# Patient Record
Sex: FEMALE | Race: BLACK OR AFRICAN AMERICAN | NOT HISPANIC OR LATINO | Employment: OTHER | URBAN - METROPOLITAN AREA
[De-identification: names, ages, dates, MRNs, and addresses within clinical notes are randomized per-mention and may not be internally consistent; named-entity substitution may affect disease eponyms.]

---

## 2020-06-25 ENCOUNTER — HOSPITAL ENCOUNTER (INPATIENT)
Facility: HOSPITAL | Age: 85
LOS: 6 days | Discharge: HOME HEALTH | DRG: 177 | End: 2020-07-01
Attending: EMERGENCY MEDICINE | Admitting: INTERNAL MEDICINE
Payer: MEDICARE

## 2020-06-25 DIAGNOSIS — U07.1 COVID-19: Primary | ICD-10-CM

## 2020-06-25 DIAGNOSIS — J96.01 ACUTE HYPOXEMIC RESPIRATORY FAILURE: ICD-10-CM

## 2020-06-25 DIAGNOSIS — R06.02 SOB (SHORTNESS OF BREATH): ICD-10-CM

## 2020-06-25 PROBLEM — I10 ESSENTIAL HYPERTENSION: Status: ACTIVE | Noted: 2020-06-25

## 2020-06-25 PROBLEM — K21.9 GASTROESOPHAGEAL REFLUX DISEASE WITHOUT ESOPHAGITIS: Status: ACTIVE | Noted: 2020-06-25

## 2020-06-25 PROBLEM — M19.91 PRIMARY OSTEOARTHRITIS: Status: ACTIVE | Noted: 2020-06-25

## 2020-06-25 LAB
ALBUMIN SERPL BCP-MCNC: 2.1 G/DL (ref 3.5–5.2)
ALLENS TEST: ABNORMAL
ALP SERPL-CCNC: 134 U/L (ref 55–135)
ALT SERPL W/O P-5'-P-CCNC: 14 U/L (ref 10–44)
ANION GAP SERPL CALC-SCNC: 8 MMOL/L (ref 8–16)
APTT BLDCRRT: 23.9 SEC (ref 21–32)
AST SERPL-CCNC: 15 U/L (ref 10–40)
BASOPHILS # BLD AUTO: 0.04 K/UL (ref 0–0.2)
BASOPHILS NFR BLD: 0.5 % (ref 0–1.9)
BILIRUB SERPL-MCNC: 0.3 MG/DL (ref 0.1–1)
BNP SERPL-MCNC: 43 PG/ML (ref 0–99)
BUN SERPL-MCNC: 20 MG/DL (ref 8–23)
CALCIUM SERPL-MCNC: 8.6 MG/DL (ref 8.7–10.5)
CHLORIDE SERPL-SCNC: 109 MMOL/L (ref 95–110)
CK SERPL-CCNC: 73 U/L (ref 20–180)
CO2 SERPL-SCNC: 20 MMOL/L (ref 23–29)
CREAT SERPL-MCNC: 1 MG/DL (ref 0.5–1.4)
D DIMER PPP IA.FEU-MCNC: 0.44 MG/L FEU
DELSYS: ABNORMAL
DIFFERENTIAL METHOD: ABNORMAL
EOSINOPHIL # BLD AUTO: 0.1 K/UL (ref 0–0.5)
EOSINOPHIL NFR BLD: 0.6 % (ref 0–8)
ERYTHROCYTE [DISTWIDTH] IN BLOOD BY AUTOMATED COUNT: 19.6 % (ref 11.5–14.5)
ERYTHROCYTE [SEDIMENTATION RATE] IN BLOOD BY WESTERGREN METHOD: 130 MM/HR (ref 0–20)
EST. GFR  (AFRICAN AMERICAN): 59 ML/MIN/1.73 M^2
EST. GFR  (NON AFRICAN AMERICAN): 51 ML/MIN/1.73 M^2
FIO2: 36
FLOW: 4
GLUCOSE SERPL-MCNC: 112 MG/DL (ref 70–110)
HCO3 UR-SCNC: 18.5 MMOL/L (ref 24–28)
HCT VFR BLD AUTO: 28.9 % (ref 37–48.5)
HGB BLD-MCNC: 8.5 G/DL (ref 12–16)
IMM GRANULOCYTES # BLD AUTO: 0.05 K/UL (ref 0–0.04)
IMM GRANULOCYTES NFR BLD AUTO: 0.6 % (ref 0–0.5)
INR PPP: 1.1 (ref 0.8–1.2)
LACTATE SERPL-SCNC: 1.4 MMOL/L (ref 0.5–2.2)
LDH SERPL L TO P-CCNC: 283 U/L (ref 110–260)
LYMPHOCYTES # BLD AUTO: 0.6 K/UL (ref 1–4.8)
LYMPHOCYTES NFR BLD: 6.7 % (ref 18–48)
MAGNESIUM SERPL-MCNC: 1.8 MG/DL (ref 1.6–2.6)
MCH RBC QN AUTO: 25.4 PG (ref 27–31)
MCHC RBC AUTO-ENTMCNC: 29.4 G/DL (ref 32–36)
MCV RBC AUTO: 86 FL (ref 82–98)
MODE: ABNORMAL
MONOCYTES # BLD AUTO: 0.3 K/UL (ref 0.3–1)
MONOCYTES NFR BLD: 3.6 % (ref 4–15)
NEUTROPHILS # BLD AUTO: 7.5 K/UL (ref 1.8–7.7)
NEUTROPHILS NFR BLD: 88 % (ref 38–73)
NRBC BLD-RTO: 0 /100 WBC
PCO2 BLDA: 27.9 MMHG (ref 35–45)
PH SMN: 7.43 [PH] (ref 7.35–7.45)
PLATELET # BLD AUTO: 395 K/UL (ref 150–350)
PMV BLD AUTO: 11.4 FL (ref 9.2–12.9)
PO2 BLDA: 65 MMHG (ref 80–100)
POC BE: -6 MMOL/L
POC SATURATED O2: 93 % (ref 95–100)
POCT GLUCOSE: 155 MG/DL (ref 70–110)
POTASSIUM SERPL-SCNC: 3.9 MMOL/L (ref 3.5–5.1)
PROCALCITONIN SERPL IA-MCNC: 0.38 NG/ML
PROT SERPL-MCNC: 6.7 G/DL (ref 6–8.4)
PROTHROMBIN TIME: 11.9 SEC (ref 9–12.5)
RBC # BLD AUTO: 3.35 M/UL (ref 4–5.4)
SAMPLE: ABNORMAL
SARS-COV-2 RDRP RESP QL NAA+PROBE: POSITIVE
SITE: ABNORMAL
SODIUM SERPL-SCNC: 137 MMOL/L (ref 136–145)
TROPONIN I SERPL DL<=0.01 NG/ML-MCNC: 0.05 NG/ML (ref 0–0.03)
WBC # BLD AUTO: 8.51 K/UL (ref 3.9–12.7)

## 2020-06-25 PROCEDURE — 82803 BLOOD GASES ANY COMBINATION: CPT

## 2020-06-25 PROCEDURE — U0002 COVID-19 LAB TEST NON-CDC: HCPCS

## 2020-06-25 PROCEDURE — 99291 CRITICAL CARE FIRST HOUR: CPT | Mod: 25

## 2020-06-25 PROCEDURE — 21400001 HC TELEMETRY ROOM

## 2020-06-25 PROCEDURE — 85025 COMPLETE CBC W/AUTO DIFF WBC: CPT

## 2020-06-25 PROCEDURE — 82550 ASSAY OF CK (CPK): CPT

## 2020-06-25 PROCEDURE — 83735 ASSAY OF MAGNESIUM: CPT

## 2020-06-25 PROCEDURE — 27000221 HC OXYGEN, UP TO 24 HOURS

## 2020-06-25 PROCEDURE — 84145 PROCALCITONIN (PCT): CPT

## 2020-06-25 PROCEDURE — 36600 WITHDRAWAL OF ARTERIAL BLOOD: CPT

## 2020-06-25 PROCEDURE — 80053 COMPREHEN METABOLIC PANEL: CPT

## 2020-06-25 PROCEDURE — 94760 N-INVAS EAR/PLS OXIMETRY 1: CPT

## 2020-06-25 PROCEDURE — 83880 ASSAY OF NATRIURETIC PEPTIDE: CPT

## 2020-06-25 PROCEDURE — 25000003 PHARM REV CODE 250: Performed by: INTERNAL MEDICINE

## 2020-06-25 PROCEDURE — 85651 RBC SED RATE NONAUTOMATED: CPT

## 2020-06-25 PROCEDURE — 99900035 HC TECH TIME PER 15 MIN (STAT)

## 2020-06-25 PROCEDURE — 93010 EKG 12-LEAD: ICD-10-PCS | Mod: ,,, | Performed by: INTERNAL MEDICINE

## 2020-06-25 PROCEDURE — 83036 HEMOGLOBIN GLYCOSYLATED A1C: CPT

## 2020-06-25 PROCEDURE — 86141 C-REACTIVE PROTEIN HS: CPT

## 2020-06-25 PROCEDURE — 82728 ASSAY OF FERRITIN: CPT

## 2020-06-25 PROCEDURE — 85730 THROMBOPLASTIN TIME PARTIAL: CPT

## 2020-06-25 PROCEDURE — 84484 ASSAY OF TROPONIN QUANT: CPT

## 2020-06-25 PROCEDURE — 87040 BLOOD CULTURE FOR BACTERIA: CPT

## 2020-06-25 PROCEDURE — 93005 ELECTROCARDIOGRAM TRACING: CPT

## 2020-06-25 PROCEDURE — 83605 ASSAY OF LACTIC ACID: CPT

## 2020-06-25 PROCEDURE — 94761 N-INVAS EAR/PLS OXIMETRY MLT: CPT

## 2020-06-25 PROCEDURE — 83615 LACTATE (LD) (LDH) ENZYME: CPT

## 2020-06-25 PROCEDURE — 36415 COLL VENOUS BLD VENIPUNCTURE: CPT

## 2020-06-25 PROCEDURE — 85610 PROTHROMBIN TIME: CPT

## 2020-06-25 PROCEDURE — 85379 FIBRIN DEGRADATION QUANT: CPT

## 2020-06-25 PROCEDURE — 63600175 PHARM REV CODE 636 W HCPCS: Performed by: INTERNAL MEDICINE

## 2020-06-25 PROCEDURE — 93010 ELECTROCARDIOGRAM REPORT: CPT | Mod: ,,, | Performed by: INTERNAL MEDICINE

## 2020-06-25 RX ORDER — GLUCAGON 1 MG
1 KIT INJECTION
Status: DISCONTINUED | OUTPATIENT
Start: 2020-06-25 | End: 2020-07-01 | Stop reason: HOSPADM

## 2020-06-25 RX ORDER — AMLODIPINE BESYLATE 10 MG/1
10 TABLET ORAL DAILY
Status: DISCONTINUED | OUTPATIENT
Start: 2020-06-26 | End: 2020-07-01 | Stop reason: HOSPADM

## 2020-06-25 RX ORDER — ACETAMINOPHEN 325 MG/1
650 TABLET ORAL EVERY 6 HOURS PRN
Status: DISCONTINUED | OUTPATIENT
Start: 2020-06-25 | End: 2020-06-25

## 2020-06-25 RX ORDER — FAMOTIDINE 20 MG/1
20 TABLET, FILM COATED ORAL DAILY
Status: DISCONTINUED | OUTPATIENT
Start: 2020-06-26 | End: 2020-06-27

## 2020-06-25 RX ORDER — ACETAMINOPHEN 325 MG/1
650 TABLET ORAL EVERY 6 HOURS PRN
Status: DISCONTINUED | OUTPATIENT
Start: 2020-06-25 | End: 2020-07-01 | Stop reason: HOSPADM

## 2020-06-25 RX ORDER — OXYBUTYNIN CHLORIDE 5 MG/1
5 TABLET, EXTENDED RELEASE ORAL DAILY
Status: DISCONTINUED | OUTPATIENT
Start: 2020-06-26 | End: 2020-07-01 | Stop reason: HOSPADM

## 2020-06-25 RX ORDER — IBUPROFEN 200 MG
24 TABLET ORAL
Status: DISCONTINUED | OUTPATIENT
Start: 2020-06-25 | End: 2020-07-01 | Stop reason: HOSPADM

## 2020-06-25 RX ORDER — IBUPROFEN 200 MG
16 TABLET ORAL
Status: DISCONTINUED | OUTPATIENT
Start: 2020-06-25 | End: 2020-07-01 | Stop reason: HOSPADM

## 2020-06-25 RX ORDER — INSULIN ASPART 100 [IU]/ML
1-10 INJECTION, SOLUTION INTRAVENOUS; SUBCUTANEOUS
Status: DISCONTINUED | OUTPATIENT
Start: 2020-06-25 | End: 2020-07-01 | Stop reason: HOSPADM

## 2020-06-25 RX ORDER — GABAPENTIN 100 MG/1
100 CAPSULE ORAL NIGHTLY
Status: DISCONTINUED | OUTPATIENT
Start: 2020-06-25 | End: 2020-07-01 | Stop reason: HOSPADM

## 2020-06-25 RX ADMIN — INSULIN ASPART 1 UNITS: 100 INJECTION, SOLUTION INTRAVENOUS; SUBCUTANEOUS at 09:06

## 2020-06-25 RX ADMIN — GABAPENTIN 100 MG: 100 CAPSULE ORAL at 09:06

## 2020-06-25 NOTE — ED NOTES
Call Lab at this time to come and stick patient for second lab cultures- patient has been stuck many time unsuccessfully

## 2020-06-25 NOTE — SUBJECTIVE & OBJECTIVE
No past medical history on file.    No past surgical history on file.    Review of patient's allergies indicates:   Allergen Reactions    Ace inhibitors Anaphylaxis    Amoxicillin Itching    Codeine Other (See Comments)     confusion    Penicillins Itching    Sulfa (sulfonamide antibiotics) Rash       No current facility-administered medications on file prior to encounter.      Current Outpatient Medications on File Prior to Encounter   Medication Sig    amlodipine (NORVASC) 10 MG tablet     diclofenac sodium (VOLTAREN) 1 % Gel Apply 4 g topically 3 (three) times daily.    gabapentin (NEURONTIN) 100 MG capsule     glipiZIDE (GLUCOTROL) 10 MG tablet     hydrochlorothiazide (HYDRODIURIL) 25 MG tablet     metformin (GLUCOPHAGE) 500 MG tablet     naproxen (NAPROSYN) 375 MG tablet     oxybutynin (DITROPAN-XL) 5 MG TR24     ranitidine (ZANTAC) 150 MG tablet      Family History     None        Tobacco Use    Smoking status: Former Smoker     Types: Cigarettes     Quit date: 1993     Years since quittin.2    Smokeless tobacco: Never Used   Substance and Sexual Activity    Alcohol use: No    Drug use: No    Sexual activity: Never     Partners: Male     Review of Systems   Constitutional: Positive for activity change, chills and fatigue.   HENT: Negative for congestion, ear pain, rhinorrhea and sore throat.    Respiratory: Positive for cough, chest tightness and shortness of breath.    Cardiovascular: Negative for chest pain.   Gastrointestinal: Positive for nausea. Negative for abdominal pain, diarrhea and vomiting.   Musculoskeletal: Positive for arthralgias.   Neurological: Negative for headaches.     Objective:     Vital Signs (Most Recent):  Temp: 98.9 °F (37.2 °C) (20 1703)  Pulse: 78 (20 1703)  Resp: 18 (20 1703)  BP: (!) 153/69 (20 1703)  SpO2: 100 % (20 1407) Vital Signs (24h Range):  Temp:  [98.6 °F (37 °C)-98.9 °F (37.2 °C)] 98.9 °F (37.2 °C)  Pulse:   [78-91] 78  Resp:  [16-34] 18  SpO2:  [89 %-100 %] 100 %  BP: (120-153)/(56-69) 153/69     Weight: 71.2 kg (157 lb)  Body mass index is 26.95 kg/m².    Physical Exam  Vitals signs and nursing note reviewed.   Constitutional:       Appearance: She is well-developed. She is ill-appearing.   HENT:      Head: Normocephalic and atraumatic.      Right Ear: External ear normal.      Left Ear: External ear normal.      Nose: Nose normal.   Eyes:      Conjunctiva/sclera: Conjunctivae normal.      Pupils: Pupils are equal, round, and reactive to light.   Neck:      Musculoskeletal: Normal range of motion and neck supple.      Thyroid: No thyromegaly.      Vascular: No JVD.   Cardiovascular:      Rate and Rhythm: Normal rate and regular rhythm.      Heart sounds: Normal heart sounds. No murmur. No friction rub. No gallop.    Pulmonary:      Effort: Pulmonary effort is normal. No respiratory distress.      Breath sounds: Normal breath sounds. No stridor. No wheezing or rales.   Chest:      Chest wall: No tenderness.   Abdominal:      General: Bowel sounds are normal. There is no distension.      Palpations: Abdomen is soft. There is no mass.      Tenderness: There is no abdominal tenderness. There is no guarding or rebound.   Genitourinary:     Vagina: Normal. No vaginal discharge.   Musculoskeletal: Normal range of motion.         General: No deformity.   Lymphadenopathy:      Cervical: No cervical adenopathy.   Skin:     General: Skin is warm.      Capillary Refill: Capillary refill takes less than 2 seconds.      Findings: No erythema or rash.   Neurological:      Mental Status: She is alert and oriented to person, place, and time.      Cranial Nerves: No cranial nerve deficit.      Sensory: No sensory deficit.      Deep Tendon Reflexes: Reflexes normal.   Psychiatric:         Behavior: Behavior normal.           CRANIAL NERVES     CN III, IV, VI   Pupils are equal, round, and reactive to light.       Significant Labs: All  pertinent labs within the past 24 hours have been reviewed.    Significant Imaging: I have reviewed and interpreted all pertinent imaging results/findings within the past 24 hours.

## 2020-06-25 NOTE — ED PROVIDER NOTES
Encounter Date: 2020    SCRIBE #1 NOTE: I, Linus Haro, am scribing for, and in the presence of,  Nemesio Queen MD. I have scribed the following portions of the note - Other sections scribed: ED Course.       History     Chief Complaint   Patient presents with    Shortness of Breath     pt sent from nursing home with shortness of breath.       86 y/o F with PMH of COVID here with worsening SOB. Patient says that today, she was unable to catch her breath. This is worsening over the past week, and patient has had 2 negative COVID tests during this time. Patient on 5L [place] oxygen, prior to COVID, she was not needing any oxygen. Patient otherwise has no complaints. Coming from nursing home. History of dementia, history limited.         Review of patient's allergies indicates:   Allergen Reactions    Ace inhibitors Anaphylaxis    Amoxicillin Itching    Codeine Other (See Comments)     confusion    Penicillins Itching    Sulfa (sulfonamide antibiotics) Rash     Past Medical History:  History reviewed. No pertinent medical history.    Past Surgical History:  History reviewed. No pertinent surgical history.    Family History:  History reviewed. No pertinent family history.      Social History     Tobacco Use    Smoking status: Former Smoker     Types: Cigarettes     Quit date: 1993     Years since quittin.2    Smokeless tobacco: Never Used   Substance Use Topics    Alcohol use: No    Drug use: No     Review of Systems   Unable to perform ROS: Dementia   Respiratory: Positive for shortness of breath.        Physical Exam     Initial Vitals   BP Pulse Resp Temp SpO2   20 1218 20 1218 20 1218 20 1100 20 1218   123/60 88 (!) 25 98.6 °F (37 °C) 100 %      MAP       --                Physical Exam    Constitutional:   Elderly. No acute distress.    HENT:   Head: Normocephalic and atraumatic.   Eyes: EOM are normal. Pupils are equal, round, and reactive to light.   Neck:  Normal range of motion. Neck supple.   Cardiovascular: Regular rhythm.   Tachycardia   Pulmonary/Chest: She is in respiratory distress. She has no wheezes.   Mild respiratory distress. Needing NC oxygen.    Abdominal: She exhibits no distension. There is no abdominal tenderness.   Neurological: She is alert.   Awake. Oriented to person, situation, not place or time.    Skin: Skin is warm and dry.   Psychiatric: She has a normal mood and affect.         ED Course   Critical Care    Date/Time: 6/25/2020 12:59 PM  Performed by: Nemesio Queen MD  Authorized by: Nemesio Queen MD   Direct patient critical care time: 20 minutes  Additional history critical care time: 10 minutes  Ordering / reviewing critical care time: 5 minutes  Documentation critical care time: 4 minutes  Total critical care time (exclusive of procedural time) : 39 minutes  Critical care time was exclusive of separately billable procedures and treating other patients and teaching time.  Critical care was necessary to treat or prevent imminent or life-threatening deterioration of the following conditions: acute respiratory failure.  Critical care was time spent personally by me on the following activities: blood draw for specimens, development of treatment plan with patient or surrogate, interpretation of cardiac output measurements, evaluation of patient's response to treatment, examination of patient, obtaining history from patient or surrogate, ordering and performing treatments and interventions, ordering and review of laboratory studies, ordering and review of radiographic studies, pulse oximetry, re-evaluation of patient's condition and review of old charts.        Labs Reviewed   SARS-COV-2 RNA AMPLIFICATION, QUAL - Abnormal; Notable for the following components:       Result Value    SARS-CoV-2 RNA, Amplification, Qual Positive (*)     All other components within normal limits   CBC W/ AUTO DIFFERENTIAL - Abnormal; Notable for the following  components:    RBC 3.35 (*)     Hemoglobin 8.5 (*)     Hematocrit 28.9 (*)     Mean Corpuscular Hemoglobin 25.4 (*)     Mean Corpuscular Hemoglobin Conc 29.4 (*)     RDW 19.6 (*)     Platelets 395 (*)     Immature Granulocytes 0.6 (*)     Immature Grans (Abs) 0.05 (*)     Lymph # 0.6 (*)     Gran% 88.0 (*)     Lymph% 6.7 (*)     Mono% 3.6 (*)     All other components within normal limits   COMPREHENSIVE METABOLIC PANEL - Abnormal; Notable for the following components:    CO2 20 (*)     Glucose 112 (*)     Calcium 8.6 (*)     Albumin 2.1 (*)     eGFR if  59 (*)     eGFR if non  51 (*)     All other components within normal limits   TROPONIN I - Abnormal; Notable for the following components:    Troponin I 0.046 (*)     All other components within normal limits   ISTAT PROCEDURE - Abnormal; Notable for the following components:    POC PCO2 27.9 (*)     POC PO2 65 (*)     POC HCO3 18.5 (*)     POC SATURATED O2 93 (*)     All other components within normal limits   CULTURE, BLOOD   B-TYPE NATRIURETIC PEPTIDE   MAGNESIUM   LACTIC ACID, PLASMA   URINALYSIS, REFLEX TO URINE CULTURE     Results for orders placed or performed during the hospital encounter of 06/25/20   COVID-19 Rapid Screening   Result Value Ref Range    SARS-CoV-2 RNA, Amplification, Qual Positive (A) Negative   CBC auto differential   Result Value Ref Range    WBC 8.51 3.90 - 12.70 K/uL    RBC 3.35 (L) 4.00 - 5.40 M/uL    Hemoglobin 8.5 (L) 12.0 - 16.0 g/dL    Hematocrit 28.9 (L) 37.0 - 48.5 %    Mean Corpuscular Volume 86 82 - 98 fL    Mean Corpuscular Hemoglobin 25.4 (L) 27.0 - 31.0 pg    Mean Corpuscular Hemoglobin Conc 29.4 (L) 32.0 - 36.0 g/dL    RDW 19.6 (H) 11.5 - 14.5 %    Platelets 395 (H) 150 - 350 K/uL    MPV 11.4 9.2 - 12.9 fL    Immature Granulocytes 0.6 (H) 0.0 - 0.5 %    Gran # (ANC) 7.5 1.8 - 7.7 K/uL    Immature Grans (Abs) 0.05 (H) 0.00 - 0.04 K/uL    Lymph # 0.6 (L) 1.0 - 4.8 K/uL    Mono # 0.3 0.3 - 1.0  K/uL    Eos # 0.1 0.0 - 0.5 K/uL    Baso # 0.04 0.00 - 0.20 K/uL    nRBC 0 0 /100 WBC    Gran% 88.0 (H) 38.0 - 73.0 %    Lymph% 6.7 (L) 18.0 - 48.0 %    Mono% 3.6 (L) 4.0 - 15.0 %    Eosinophil% 0.6 0.0 - 8.0 %    Basophil% 0.5 0.0 - 1.9 %    Differential Method Automated    Comprehensive metabolic panel   Result Value Ref Range    Sodium 137 136 - 145 mmol/L    Potassium 3.9 3.5 - 5.1 mmol/L    Chloride 109 95 - 110 mmol/L    CO2 20 (L) 23 - 29 mmol/L    Glucose 112 (H) 70 - 110 mg/dL    BUN, Bld 20 8 - 23 mg/dL    Creatinine 1.0 0.5 - 1.4 mg/dL    Calcium 8.6 (L) 8.7 - 10.5 mg/dL    Total Protein 6.7 6.0 - 8.4 g/dL    Albumin 2.1 (L) 3.5 - 5.2 g/dL    Total Bilirubin 0.3 0.1 - 1.0 mg/dL    Alkaline Phosphatase 134 55 - 135 U/L    AST 15 10 - 40 U/L    ALT 14 10 - 44 U/L    Anion Gap 8 8 - 16 mmol/L    eGFR if African American 59 (A) >60 mL/min/1.73 m^2    eGFR if non African American 51 (A) >60 mL/min/1.73 m^2   Troponin I   Result Value Ref Range    Troponin I 0.046 (H) 0.000 - 0.026 ng/mL   Brain natriuretic peptide   Result Value Ref Range    BNP 43 0 - 99 pg/mL   Magnesium   Result Value Ref Range    Magnesium 1.8 1.6 - 2.6 mg/dL   Lactic acid, plasma   Result Value Ref Range    Lactate (Lactic Acid) 1.4 0.5 - 2.2 mmol/L   CK   Result Value Ref Range    CPK 73 20 - 180 U/L   Lactate dehydrogenase   Result Value Ref Range     (H) 110 - 260 U/L   Sedimentation rate   Result Value Ref Range    Sed Rate 130 (H) 0 - 20 mm/Hr   Procalcitonin   Result Value Ref Range    Procalcitonin 0.38 (H) <0.25 ng/mL   Protime-INR   Result Value Ref Range    Prothrombin Time 11.9 9.0 - 12.5 sec    INR 1.1 0.8 - 1.2   APTT   Result Value Ref Range    aPTT 23.9 21.0 - 32.0 sec   D dimer, quantitative   Result Value Ref Range    D-Dimer 0.44 <0.50 mg/L FEU   ISTAT PROCEDURE   Result Value Ref Range    POC PH 7.428 7.35 - 7.45    POC PCO2 27.9 (LL) 35 - 45 mmHg    POC PO2 65 (L) 80 - 100 mmHg    POC HCO3 18.5 (L) 24 - 28 mmol/L     POC BE -6 -2 to 2 mmol/L    POC SATURATED O2 93 (L) 95 - 100 %    Sample ARTERIAL     Site RR     Allens Test Pass     DelSys Nasal Can     Mode SPONT     Flow 4     FiO2 36    POCT glucose   Result Value Ref Range    POCT Glucose 155 (H) 70 - 110 mg/dL       EKG Readings: (Independently Interpreted)     Rate of 116 beats per minute.  Sinus tachycardia with occasional PVC.  P.r., QRS and QTC within normal limits.  Right axis deviation.  No STEMI.     ECG Results          EKG 12-lead (Final result)  Result time 06/25/20 12:21:30    Final result by Interface, Lab [facility] (06/25/20 12:21:30)                 Narrative:    Test Reason : R06.02,    Vent. Rate : 116 BPM     Atrial Rate : 116 BPM     P-R Int : 152 ms          QRS Dur : 086 ms      QT Int : 350 ms       P-R-T Axes : 024 105 023 degrees     QTc Int : 486 ms    Sinus tachycardia with occasional Premature ventricular complexes  Rightward axis  Possible Anterior infarct ,age undetermined  Abnormal ECG  No previous ECGs available  Confirmed by RITA HOUSTON MD ([id number]) on 6/25/2020 12:21:22 PM    Referred By:             Confirmed By:RITA HOUSTON MD                            Imaging Results          X-Ray Chest AP Portable (Final result)  Result time 06/25/20 12:15:19    Final result by Tai Melendez MD (06/25/20 12:15:19)                 Impression:      Scattered ground-glass pulmonary infiltrates greatest within the lower lobe on the left.Findings are not entirely specific but can be seen with viral pneumonitis/COVID-19.      Electronically signed by: Tai Melendez MD  Date:    06/25/2020  Time:    12:15             Narrative:    EXAMINATION:  XR CHEST AP PORTABLE    CLINICAL HISTORY:  SOB;    FINDINGS:  Single view of the chest.    No comparison    Cardiac silhouette is normal.  Scattered ground-glass pulmonary infiltrates greatest within the lower lobe on the left.Findings are not entirely specific but can be seen with viral pneumonitis/COVID-19..   Small left pleural effusion.  No pneumothorax.  Bones demonstrate moderate degenerative change.                                1:15 PM: Discussed case with Asaf Mccann, Nurse Practitioner ([facility]). Dr. Contreras agrees with current care and management of pt and accepts admission.   Admitting Service: Hospital Medicine  Admit to: obs / tele    Re-evaluated pt. I have discussed test results, shared treatment plan, and the need for admission with patient and family at bedside. Pt and family express understanding at this time and agree with all information. All questions answered. Pt and family have no further questions or concerns at this time. Pt is ready for admit.       Medical Decision Making:   Clinical Tests:   Lab Tests: Ordered and Reviewed  Radiological Study: Ordered and Reviewed  Medical Tests: Ordered and Reviewed            Scribe Attestation:   Scribe #1: I performed the above scribed service and the documentation accurately describes the services I performed. I attest to the accuracy of the note.    Attending Attestation:           Physician Attestation for Scribe:  Physician Attestation Statement for Scribe #1: I, Nemesio Queen MD, reviewed documentation, as scribed by Linus Haro in my presence, and it is both accurate and complete.                 ED Course as of Jun 25 2319   Thu Jun 25, 2020   1256 SARS-CoV-2 RNA, Amplification, Qual(!): Positive [BA]      ED Course User Index  [BA] Nemesio Queen MD                Clinical Impression:       ICD-10-CM ICD-9-CM   1. COVID-19  U07.1    2. SOB (shortness of breath)  R06.02 786.05   3. Acute hypoxemic respiratory failure  J96.01 518.81         Disposition:   Disposition: Placed in Observation  Condition: Fair     ED Disposition Condition    Observation                           Nemesio Queen MD  06/25/20 [id number]

## 2020-06-25 NOTE — H&P
[facility] Hospital Medicine  History & Physical    Patient Name: Hilary Esquead  MRN: [id number]  Admission Date: 6/25/2020  Attending Physician: Mauro Contreras MD   Primary Care Provider: Afua Hector MD         Patient information was obtained from patient and ER records.     Subjective:     Principal Problem:COVID-19    Chief Complaint:   Chief Complaint   Patient presents with    Shortness of Breath     pt sent from nursing home with shortness of breath.          HPI: Mrs. Esqueda is an 86 yo AAF with Hx of HTN, DM, GERD, OA, Mild Dementia who comes from NH for evaluation of SOB. Per patient she reports that for the past week she has not been feeling well. She endorses chills, cough, SOB, nausea. Notes that she had two negative COVID test during this time. Today she was unable to catch her breath and required oxygen. In ED patient noted to be hypoxic, with COVID+ and CXR concerning for PNA and admitted for further management.       No past medical history on file.    No past surgical history on file.    Review of patient's allergies indicates:   Allergen Reactions    Ace inhibitors Anaphylaxis    Amoxicillin Itching    Codeine Other (See Comments)     confusion    Penicillins Itching    Sulfa (sulfonamide antibiotics) Rash       No current facility-administered medications on file prior to encounter.      Current Outpatient Medications on File Prior to Encounter   Medication Sig    amlodipine (NORVASC) 10 MG tablet     diclofenac sodium (VOLTAREN) 1 % Gel Apply 4 g topically 3 (three) times daily.    gabapentin (NEURONTIN) 100 MG capsule     glipiZIDE (GLUCOTROL) 10 MG tablet     hydrochlorothiazide (HYDRODIURIL) 25 MG tablet     metformin (GLUCOPHAGE) 500 MG tablet     naproxen (NAPROSYN) 375 MG tablet     oxybutynin (DITROPAN-XL) 5 MG TR24     ranitidine (ZANTAC) 150 MG tablet      Family History     None        Tobacco Use    Smoking status: Former Smoker     Types:  Cigarettes     Quit date: 1993     Years since quittin.2    Smokeless tobacco: Never Used   Substance and Sexual Activity    Alcohol use: No    Drug use: No    Sexual activity: Never     Partners: Male     Review of Systems   Constitutional: Positive for activity change, chills and fatigue.   HENT: Negative for congestion, ear pain, rhinorrhea and sore throat.    Respiratory: Positive for cough, chest tightness and shortness of breath.    Cardiovascular: Negative for chest pain.   Gastrointestinal: Positive for nausea. Negative for abdominal pain, diarrhea and vomiting.   Musculoskeletal: Positive for arthralgias.   Neurological: Negative for headaches.     Objective:     Vital Signs (Most Recent):  Temp: 98.9 °F (37.2 °C) (20 1703)  Pulse: 78 (20 1703)  Resp: 18 (20 1703)  BP: (!) 153/69 (20 1703)  SpO2: 100 % (20 1407) Vital Signs (24h Range):  Temp:  [98.6 °F (37 °C)-98.9 °F (37.2 °C)] 98.9 °F (37.2 °C)  Pulse:  [78-91] 78  Resp:  [16-34] 18  SpO2:  [89 %-100 %] 100 %  BP: (120-153)/(56-69) 153/69     Weight: 71.2 kg (157 lb)  Body mass index is 26.95 kg/m².    Physical Exam  Vitals signs and nursing note reviewed.   Constitutional:       Appearance: She is well-developed. She is ill-appearing.   HENT:      Head: Normocephalic and atraumatic.      Right Ear: External ear normal.      Left Ear: External ear normal.      Nose: Nose normal.   Eyes:      Conjunctiva/sclera: Conjunctivae normal.      Pupils: Pupils are equal, round, and reactive to light.   Neck:      Musculoskeletal: Normal range of motion and neck supple.      Thyroid: No thyromegaly.      Vascular: No JVD.   Cardiovascular:      Rate and Rhythm: Normal rate and regular rhythm.      Heart sounds: Normal heart sounds. No murmur. No friction rub. No gallop.    Pulmonary:      Effort: Pulmonary effort is normal. No respiratory distress.      Breath sounds: Normal breath sounds. No stridor. No wheezing or  rales.   Chest:      Chest wall: No tenderness.   Abdominal:      General: Bowel sounds are normal. There is no distension.      Palpations: Abdomen is soft. There is no mass.      Tenderness: There is no abdominal tenderness. There is no guarding or rebound.   Genitourinary:     Vagina: Normal. No vaginal discharge.   Musculoskeletal: Normal range of motion.         General: No deformity.   Lymphadenopathy:      Cervical: No cervical adenopathy.   Skin:     General: Skin is warm.      Capillary Refill: Capillary refill takes less than 2 seconds.      Findings: No erythema or rash.   Neurological:      Mental Status: She is alert and oriented to person, place, and time.      Cranial Nerves: No cranial nerve deficit.      Sensory: No sensory deficit.      Deep Tendon Reflexes: Reflexes normal.   Psychiatric:         Behavior: Behavior normal.           CRANIAL NERVES     CN III, IV, VI   Pupils are equal, round, and reactive to light.       Significant Labs: All pertinent labs within the past 24 hours have been reviewed.    Significant Imaging: I have reviewed and interpreted all pertinent imaging results/findings within the past 24 hours.    Assessment/Plan:     * COVID-19  - COVID-19 testing: positive   - Infection Control notified     - Isolation:   - Airborne, Contact and Droplet Precautions  - Cohort patients into COVID units  - N95 mask, wear eye protection  - 20 second hand hygiene              - Limit visitors per hospital policy              - Consolidating lab draws, nursing care, provider visits, and interventions    - Diagnostics: (leukopenia, hyponatremia, hyperferritinemia, elevated troponin, elevated d-dimer, age, and comorbidities are significant predictors of poor clinical outcome)  CBC, CMP, Ferritin, CRP and Portable CXR    - Management:  Supplemental O2 to maintain SpO2 >92%  Telemetry  Continuous/intermittent Pulse Ox  Albuterol treatment PRN  Labs still pending  Pharmacy Consult for Remdesivir                 SOB (shortness of breath)  2/2 COVID   Treatment as above       Acute hypoxemic respiratory failure  Monitor Respiratory Status  Supplemental Oxygen keep O2 Sat >92%        Gastroesophageal reflux disease without esophagitis  PPI     Essential hypertension  Monitor BP  Norvasc started       Diabetes mellitus without complication  Monitor F/S  Diabetic Diet  ISS        VTE Risk Mitigation (From admission, onward)    None             Mauro Contreras MD  Department of Hospital Medicine   [facility]

## 2020-06-25 NOTE — HPI
Mrs. Esqueda is an 88 yo AAF with Hx of HTN, DM, GERD, OA, Mild Dementia who comes from NH for evaluation of SOB. Per patient she reports that for the past week she has not been feeling well. She endorses chills, cough, SOB, nausea. Notes that she had two negative COVID test during this time. Today she was unable to catch her breath and required oxygen. In ED patient noted to be hypoxic, with COVID+ and CXR concerning for PNA and admitted for further management.

## 2020-06-25 NOTE — ED NOTES
Patient was dx with Covid in February and has had two negative test since them. Patient started with SOB and cough x2days ago and was sent over by nursing home to be retested.

## 2020-06-25 NOTE — PLAN OF CARE
Pt AAO x4.  VSS.  Pt remained afebrile throughout this shift.   Pt currently has no IV site.    Pt remained free of falls this shift.   Pt c/o no pain this shift  Plan of care reviewed. Patient verbalizes understanding.   Pt moving/turing independently. Frequent weight shifting encouraged.  Patient afib on monitor.   No SOB reported   Bed low, side rails up x 2, wheels locked, call light in reach.   Bed alarm maintained for safety.   Patient instructed to call for assistance.   Hourly rounding completed.   Will continue to monitor.

## 2020-06-25 NOTE — ASSESSMENT & PLAN NOTE
- COVID-19 testing: positive   - Infection Control notified     - Isolation:   - Airborne, Contact and Droplet Precautions  - Cohort patients into COVID units  - N95 mask, wear eye protection  - 20 second hand hygiene              - Limit visitors per hospital policy              - Consolidating lab draws, nursing care, provider visits, and interventions    - Diagnostics: (leukopenia, hyponatremia, hyperferritinemia, elevated troponin, elevated d-dimer, age, and comorbidities are significant predictors of poor clinical outcome)  CBC, CMP, Ferritin, CRP and Portable CXR    - Management:  Supplemental O2 to maintain SpO2 >92%  Telemetry  Continuous/intermittent Pulse Ox  Albuterol treatment PRN  Labs still pending  Pharmacy Consult for Remdesivir

## 2020-06-26 LAB
ALBUMIN SERPL BCP-MCNC: 1.8 G/DL (ref 3.5–5.2)
ALP SERPL-CCNC: 113 U/L (ref 55–135)
ALT SERPL W/O P-5'-P-CCNC: 12 U/L (ref 10–44)
ANION GAP SERPL CALC-SCNC: 11 MMOL/L (ref 8–16)
AST SERPL-CCNC: 13 U/L (ref 10–40)
BACTERIA #/AREA URNS HPF: ABNORMAL /HPF
BASOPHILS # BLD AUTO: 0.03 K/UL (ref 0–0.2)
BASOPHILS NFR BLD: 0.6 % (ref 0–1.9)
BILIRUB SERPL-MCNC: 0.3 MG/DL (ref 0.1–1)
BILIRUB UR QL STRIP: NEGATIVE
BUN SERPL-MCNC: 18 MG/DL (ref 8–23)
CALCIUM SERPL-MCNC: 8 MG/DL (ref 8.7–10.5)
CHLORIDE SERPL-SCNC: 110 MMOL/L (ref 95–110)
CLARITY UR: ABNORMAL
CO2 SERPL-SCNC: 20 MMOL/L (ref 23–29)
COLOR UR: YELLOW
CREAT SERPL-MCNC: 0.9 MG/DL (ref 0.5–1.4)
CRP SERPL-MCNC: 85.01 MG/L (ref 0–3.19)
DIFFERENTIAL METHOD: ABNORMAL
EOSINOPHIL # BLD AUTO: 0.3 K/UL (ref 0–0.5)
EOSINOPHIL NFR BLD: 6.9 % (ref 0–8)
ERYTHROCYTE [DISTWIDTH] IN BLOOD BY AUTOMATED COUNT: 19.8 % (ref 11.5–14.5)
EST. GFR  (AFRICAN AMERICAN): >60 ML/MIN/1.73 M^2
EST. GFR  (NON AFRICAN AMERICAN): 58 ML/MIN/1.73 M^2
ESTIMATED AVG GLUCOSE: 146 MG/DL (ref 68–131)
FERRITIN SERPL-MCNC: 409 NG/ML (ref 20–300)
GLUCOSE SERPL-MCNC: 90 MG/DL (ref 70–110)
GLUCOSE UR QL STRIP: NEGATIVE
HBA1C MFR BLD HPLC: 6.7 % (ref 4–5.6)
HCT VFR BLD AUTO: 25.3 % (ref 37–48.5)
HGB BLD-MCNC: 7.6 G/DL (ref 12–16)
HGB UR QL STRIP: ABNORMAL
HYALINE CASTS #/AREA URNS LPF: 0 /LPF
IMM GRANULOCYTES # BLD AUTO: 0.04 K/UL (ref 0–0.04)
IMM GRANULOCYTES NFR BLD AUTO: 0.8 % (ref 0–0.5)
KETONES UR QL STRIP: NEGATIVE
LEUKOCYTE ESTERASE UR QL STRIP: ABNORMAL
LYMPHOCYTES # BLD AUTO: 1.3 K/UL (ref 1–4.8)
LYMPHOCYTES NFR BLD: 26.2 % (ref 18–48)
MCH RBC QN AUTO: 26.3 PG (ref 27–31)
MCHC RBC AUTO-ENTMCNC: 30 G/DL (ref 32–36)
MCV RBC AUTO: 88 FL (ref 82–98)
MICROSCOPIC COMMENT: ABNORMAL
MONOCYTES # BLD AUTO: 0.4 K/UL (ref 0.3–1)
MONOCYTES NFR BLD: 7.9 % (ref 4–15)
NEUTROPHILS # BLD AUTO: 2.9 K/UL (ref 1.8–7.7)
NEUTROPHILS NFR BLD: 57.6 % (ref 38–73)
NITRITE UR QL STRIP: NEGATIVE
NRBC BLD-RTO: 0 /100 WBC
PH UR STRIP: 7 [PH] (ref 5–8)
PLATELET # BLD AUTO: 322 K/UL (ref 150–350)
PMV BLD AUTO: 12 FL (ref 9.2–12.9)
POCT GLUCOSE: 100 MG/DL (ref 70–110)
POCT GLUCOSE: 182 MG/DL (ref 70–110)
POCT GLUCOSE: 185 MG/DL (ref 70–110)
POCT GLUCOSE: 214 MG/DL (ref 70–110)
POTASSIUM SERPL-SCNC: 4 MMOL/L (ref 3.5–5.1)
PROT SERPL-MCNC: 5.8 G/DL (ref 6–8.4)
PROT UR QL STRIP: ABNORMAL
RBC # BLD AUTO: 2.89 M/UL (ref 4–5.4)
RBC #/AREA URNS HPF: 5 /HPF (ref 0–4)
SODIUM SERPL-SCNC: 141 MMOL/L (ref 136–145)
SP GR UR STRIP: 1.02 (ref 1–1.03)
URN SPEC COLLECT METH UR: ABNORMAL
UROBILINOGEN UR STRIP-ACNC: NEGATIVE EU/DL
WBC # BLD AUTO: 4.96 K/UL (ref 3.9–12.7)
WBC #/AREA URNS HPF: >100 /HPF (ref 0–5)

## 2020-06-26 PROCEDURE — C1751 CATH, INF, PER/CENT/MIDLINE: HCPCS

## 2020-06-26 PROCEDURE — 87077 CULTURE AEROBIC IDENTIFY: CPT

## 2020-06-26 PROCEDURE — 80053 COMPREHEN METABOLIC PANEL: CPT

## 2020-06-26 PROCEDURE — 36569 INSJ PICC 5 YR+ W/O IMAGING: CPT

## 2020-06-26 PROCEDURE — 87186 SC STD MICRODIL/AGAR DIL: CPT

## 2020-06-26 PROCEDURE — 87088 URINE BACTERIA CULTURE: CPT

## 2020-06-26 PROCEDURE — 25000003 PHARM REV CODE 250: Performed by: INTERNAL MEDICINE

## 2020-06-26 PROCEDURE — 87086 URINE CULTURE/COLONY COUNT: CPT

## 2020-06-26 PROCEDURE — 36415 COLL VENOUS BLD VENIPUNCTURE: CPT

## 2020-06-26 PROCEDURE — 81000 URINALYSIS NONAUTO W/SCOPE: CPT

## 2020-06-26 PROCEDURE — 85025 COMPLETE CBC W/AUTO DIFF WBC: CPT

## 2020-06-26 PROCEDURE — 21400001 HC TELEMETRY ROOM

## 2020-06-26 PROCEDURE — 27000221 HC OXYGEN, UP TO 24 HOURS

## 2020-06-26 RX ORDER — DOXYCYCLINE HYCLATE 100 MG
100 TABLET ORAL EVERY 12 HOURS
Status: COMPLETED | OUTPATIENT
Start: 2020-06-26 | End: 2020-06-30

## 2020-06-26 RX ORDER — MAG HYDROX/ALUMINUM HYD/SIMETH 200-200-20
30 SUSPENSION, ORAL (FINAL DOSE FORM) ORAL EVERY 6 HOURS PRN
Status: DISCONTINUED | OUTPATIENT
Start: 2020-06-26 | End: 2020-07-01 | Stop reason: HOSPADM

## 2020-06-26 RX ORDER — LIDOCAINE HYDROCHLORIDE 10 MG/ML
1 INJECTION INFILTRATION; PERINEURAL ONCE AS NEEDED
Status: DISCONTINUED | OUTPATIENT
Start: 2020-06-26 | End: 2020-07-01 | Stop reason: HOSPADM

## 2020-06-26 RX ORDER — TRAMADOL HYDROCHLORIDE 50 MG/1
50 TABLET ORAL EVERY 6 HOURS PRN
Status: DISCONTINUED | OUTPATIENT
Start: 2020-06-26 | End: 2020-07-01 | Stop reason: HOSPADM

## 2020-06-26 RX ADMIN — INSULIN ASPART 4 UNITS: 100 INJECTION, SOLUTION INTRAVENOUS; SUBCUTANEOUS at 05:06

## 2020-06-26 RX ADMIN — GABAPENTIN 100 MG: 100 CAPSULE ORAL at 10:06

## 2020-06-26 RX ADMIN — AMLODIPINE BESYLATE 10 MG: 10 TABLET ORAL at 09:06

## 2020-06-26 RX ADMIN — INSULIN ASPART 2 UNITS: 100 INJECTION, SOLUTION INTRAVENOUS; SUBCUTANEOUS at 12:06

## 2020-06-26 RX ADMIN — FAMOTIDINE 20 MG: 20 TABLET ORAL at 09:06

## 2020-06-26 RX ADMIN — DOXYCYCLINE HYCLATE 100 MG: 100 TABLET, COATED ORAL at 10:06

## 2020-06-26 RX ADMIN — INSULIN ASPART 1 UNITS: 100 INJECTION, SOLUTION INTRAVENOUS; SUBCUTANEOUS at 10:06

## 2020-06-26 RX ADMIN — ACETAMINOPHEN 650 MG: 325 TABLET ORAL at 09:06

## 2020-06-26 RX ADMIN — DOXYCYCLINE HYCLATE 100 MG: 100 TABLET, COATED ORAL at 09:06

## 2020-06-26 RX ADMIN — OXYBUTYNIN CHLORIDE 5 MG: 5 TABLET, EXTENDED RELEASE ORAL at 09:06

## 2020-06-26 NOTE — SUBJECTIVE & OBJECTIVE
No past medical history on file.    No past surgical history on file.    Review of patient's allergies indicates:   Allergen Reactions    Ace inhibitors Anaphylaxis    Amoxicillin Itching    Codeine Other (See Comments)     confusion    Penicillins Itching    Sulfa (sulfonamide antibiotics) Rash       No current facility-administered medications on file prior to encounter.      Current Outpatient Medications on File Prior to Encounter   Medication Sig    amlodipine (NORVASC) 10 MG tablet     diclofenac sodium (VOLTAREN) 1 % Gel Apply 4 g topically 3 (three) times daily.    gabapentin (NEURONTIN) 100 MG capsule     glipiZIDE (GLUCOTROL) 10 MG tablet     hydrochlorothiazide (HYDRODIURIL) 25 MG tablet     metformin (GLUCOPHAGE) 500 MG tablet     naproxen (NAPROSYN) 375 MG tablet     oxybutynin (DITROPAN-XL) 5 MG TR24     ranitidine (ZANTAC) 150 MG tablet      Family History     None        Tobacco Use    Smoking status: Former Smoker     Types: Cigarettes     Quit date: 1993     Years since quittin.2    Smokeless tobacco: Never Used   Substance and Sexual Activity    Alcohol use: No    Drug use: No    Sexual activity: Never     Partners: Male     Review of Systems   Constitutional: Positive for activity change, chills and fatigue.   HENT: Negative for congestion, ear pain, rhinorrhea and sore throat.    Respiratory: Positive for cough, chest tightness and shortness of breath.    Cardiovascular: Negative for chest pain.   Gastrointestinal: Positive for nausea. Negative for abdominal pain, diarrhea and vomiting.   Musculoskeletal: Positive for arthralgias.   Neurological: Negative for headaches.     Objective:     Vital Signs (Most Recent):  Temp: 98.2 °F (36.8 °C) (20)  Pulse: 83 (20 08)  Resp: 16 (20)  BP: 137/60 (20)  SpO2: 97 % (20) Vital Signs (24h Range):  Temp:  [97.9 °F (36.6 °C)-98.9 °F (37.2 °C)] 98.2 °F (36.8 °C)  Pulse:  [77-91]  83  Resp:  [16-34] 16  SpO2:  [89 %-100 %] 97 %  BP: (114-153)/(51-81) 137/60     Weight: 73.9 kg (162 lb 14.7 oz)  Body mass index is 27.97 kg/m².    Physical Exam  Vitals signs and nursing note reviewed.   Constitutional:       Appearance: She is well-developed. She is ill-appearing.   HENT:      Head: Normocephalic and atraumatic.      Right Ear: External ear normal.      Left Ear: External ear normal.      Nose: Nose normal.   Eyes:      Conjunctiva/sclera: Conjunctivae normal.      Pupils: Pupils are equal, round, and reactive to light.   Neck:      Musculoskeletal: Normal range of motion and neck supple.      Thyroid: No thyromegaly.      Vascular: No JVD.   Cardiovascular:      Rate and Rhythm: Normal rate and regular rhythm.      Heart sounds: Normal heart sounds. No murmur. No friction rub. No gallop.    Pulmonary:      Effort: Pulmonary effort is normal. No respiratory distress.      Breath sounds: Normal breath sounds. No stridor. No wheezing or rales.   Chest:      Chest wall: No tenderness.   Abdominal:      General: Bowel sounds are normal. There is no distension.      Palpations: Abdomen is soft. There is no mass.      Tenderness: There is no abdominal tenderness. There is no guarding or rebound.   Genitourinary:     Vagina: Normal. No vaginal discharge.   Musculoskeletal: Normal range of motion.         General: No deformity.   Lymphadenopathy:      Cervical: No cervical adenopathy.   Skin:     General: Skin is warm.      Capillary Refill: Capillary refill takes less than 2 seconds.      Findings: No erythema or rash.   Neurological:      Mental Status: She is alert and oriented to person, place, and time.      Cranial Nerves: No cranial nerve deficit.      Sensory: No sensory deficit.      Deep Tendon Reflexes: Reflexes normal.   Psychiatric:         Behavior: Behavior normal.           CRANIAL NERVES     CN III, IV, VI   Pupils are equal, round, and reactive to light.       Significant Labs: All  pertinent labs within the past 24 hours have been reviewed.    Significant Imaging: I have reviewed and interpreted all pertinent imaging results/findings within the past 24 hours.

## 2020-06-26 NOTE — PLAN OF CARE
Initial assessment completed.Called patient in her room. Pt has COVID and unable to meet in person secondary to isolation precautions. Pt AAOx3, states that she lives at home with her granddaughter and sons. She uses a cane and walker to ambulate and needs minimal assistant with with ADLs.  Patient came from [facility] for rehab. Unsure at this time is she will return or return home with sons and granddaughter or to [facility].   06/26/20 1305   Discharge Assessment   Assessment Type Discharge Planning Assessment   Confirmed/corrected address and phone number on facesheet? Yes   Assessment information obtained from? Caregiver;Patient  (also spoke with granddaughter)   Communicated expected length of stay with patient/caregiver no   Prior to hospitilization cognitive status: Alert/Oriented   Prior to hospitalization functional status: Independent;Assistive Equipment   Current cognitive status: Alert/Oriented   Current Functional Status: Independent;Assistive Equipment   Facility Arrived From: home   Lives With child(jerzy), adult  (lives with adult sons)   Able to Return to Prior Arrangements yes   Is patient able to care for self after discharge? Yes   Who are your caregiver(s) and their phone number(s)? daija Esqueda- [phone]   Patient's perception of discharge disposition home or selfcare   Readmission Within the Last 30 Days no previous admission in last 30 days   Patient currently being followed by outpatient case management? No   Patient currently receives any other outside agency services? No   Equipment Currently Used at Home walker, rolling;cane, straight   Part D Coverage n/a   Do you have any problems affording any of your prescribed medications? No   Is the patient taking medications as prescribed? yes   Does the patient have transportation home? Yes   Transportation Anticipated family or friend will provide   Dialysis Name and Scheduled days n/a   Discharge Plan A Home;Skilled  Nursing Facility   Discharge Plan B Home   DME Needed Upon Discharge  none  (may need RT consult to see if need home O2)   Patient/Family in Agreement with Plan yes    Transitional Care Folder, Discharge Planning Begins on Admission pamphlet, [facility] Bedside Delivery pamphlet, Advance Directive information given to patient. Communication board updated with CM name and contact information. Instructed patient or family to call with any questions or concerns. Patient has PHN insurance    PCP: Afua Hector  My chart: no  Bedside Delivery: yes     No Pharmacies Listed  Afua Hector MD  Payor: [employer] MANAGED MEDICARE / Plan: [employer] CHOICES 65 / Product Type: Medicare Advantage /

## 2020-06-26 NOTE — NURSING
Message sent to SAMARA Gaston regarding oxygen for transportation home. Pt states he has a home oxygen generator and brought a full portable tank to the hospital, but someone took it out of his room. Waiting for response. Notified Benito PEREZ of situation.

## 2020-06-26 NOTE — HOSPITAL COURSE
6/26  Patient seen at bedside this morning, resting comfortably. Reports she was cold overnight. Notes he cough and SOB remain stable. Continues to report pain in her knee, discussed adding Tramadol for pain and she agreed. No other acute events in the last 24 hours.   6/27  Patient seen this morning at bedside, resting comfortably. She started Remdesivir yesterday. Also reports Tramadol is helping for pain. No acute events in the last 24 hours.   6/28   Patient seen this morning at bedside, resting comfortably. Seems as patient PICC was clotted and required Cathflo yesterday, afterwards worked fine. Due to this Remdesivir was not started until yesterday. Patient not requiring supplemental oxygen at the moment. Is feeling well, has no specific complaints.   6/29  Patient Patient improved finishing up remdesivir. Will complete on 7/1PT recommending return to SNF. Patient came from [facility] . Await placement. No events  6/30  Patient to complete remdesivir tomorrow. Patient improving  Plan for hospital bed at home with home health. Likely d/c in am  07/01: Patient continues to improve. Afebrile for several days.  Vitals and labs stable. Did not qualify for home oxygen. Pt does not have smart phone. She will have home health and a hospital bed at home. Discussed discharge with her granddaughter, Anastasia [phone], who will be available to help patient with her needs.

## 2020-06-26 NOTE — CONSULTS
06/26/20 1430   Handoff Report   Given To SONIA Rogers   Skin   Skin WDL ex   Skin Color/Characteristics redness blanchable   Skin Temperature warm   Skin Moisture dry   Skin Elasticity quick return to original state   Skin Integrity wound   Claudy Risk Assessment   Sensory Perception 4-->no impairment   Moisture 3-->occasionally moist   Activity 3-->walks occasionally   Mobility 3-->slightly limited   Nutrition 2-->probably inadequate   Friction and Shear 2-->potential problem   Claudy Score 17        Altered Skin Integrity 06/26/20 0833 Left medial Buttocks #2 Full thickness tissue loss. Subcutaneous fat may be visible but bone, tendon or muscle are not exposed   Date First Assessed/Time First Assessed: 06/26/20 0833   Altered Skin Integrity Present on Admission: yes  Side: Left  Orientation: medial  Location: Buttocks  Wound Number (optional): #2  Is this injury device related?: No  Description of Altered Ski...   Description of Altered Skin Integrity Full thickness tissue loss. Subcutaneous fat may be visible but bone, tendon or muscle are not exposed   Dressing Appearance Intact;Moist drainage   Drainage Amount Small   Drainage Characteristics/Odor Serosanguineous   Appearance Pink;Red;Yellow;Adipose;Moist   Tissue loss description Full thickness   Red (%), Wound Tissue Color 50 %   Yellow (%), Wound Tissue Color 50 %   Periwound Area Intact   Wound Edges Open   Care Cleansed with:;Sterile normal saline;Applied:;Skin Barrier   Dressing Foam;Applied   Dressing Change Due 06/30/20        Altered Skin Integrity 06/26/20 0833 Left lower Buttocks #3 Full thickness tissue loss. Subcutaneous fat may be visible but bone, tendon or muscle are not exposed   Date First Assessed/Time First Assessed: 06/26/20 0833   Altered Skin Integrity Present on Admission: yes  Side: Left  Orientation: lower  Location: Buttocks  Wound Number (optional): #3  Is this injury device related?: No  Description of Altered Skin...    Description of Altered Skin Integrity Full thickness tissue loss. Subcutaneous fat may be visible but bone, tendon or muscle are not exposed   Dressing Appearance Open to air   Drainage Amount Small   Drainage Characteristics/Odor Serosanguineous   Appearance Red;Yellow;Moist;Adipose   Tissue loss description Full thickness   Red (%), Wound Tissue Color 50 %   Yellow (%), Wound Tissue Color 50 %   Periwound Area Intact   Wound Edges Open   Care Cleansed with:;Sterile normal saline;Applied:;Skin Barrier   Dressing Foam;Applied   Dressing Change Due 06/30/20        Altered Skin Integrity 06/26/20 0833 Left lower Buttocks #4 Full thickness tissue loss. Subcutaneous fat may be visible but bone, tendon or muscle are not exposed   Date First Assessed/Time First Assessed: 06/26/20 0833   Altered Skin Integrity Present on Admission: yes  Side: Left  Orientation: lower  Location: Buttocks  Wound Number (optional): #4  Is this injury device related?: Yes  Description of Altered Ski...   Description of Altered Skin Integrity Full thickness tissue loss. Subcutaneous fat may be visible but bone, tendon or muscle are not exposed   Dressing Appearance Intact   Drainage Amount Small   Drainage Characteristics/Odor Serosanguineous   Appearance Red;Yellow;Moist;Adipose   Tissue loss description Full thickness   Red (%), Wound Tissue Color 50 %   Yellow (%), Wound Tissue Color 50 %   Care Cleansed with:;Sterile normal saline;Applied:;Skin Barrier   Dressing Foam;Applied   Dressing Change Due 06/30/20        Altered Skin Integrity 06/26/20 0833 Left medial Buttocks #5 Full thickness tissue loss. Subcutaneous fat may be visible but bone, tendon or muscle are not exposed   Date First Assessed/Time First Assessed: 06/26/20 0833   Altered Skin Integrity Present on Admission: yes  Side: Left  Orientation: medial  Location: Buttocks  Wound Number (optional): #5  Is this injury device related?: Yes  Description of Altered Sk...    Description of Altered Skin Integrity Full thickness tissue loss. Subcutaneous fat may be visible but bone, tendon or muscle are not exposed   Dressing Appearance Intact   Drainage Amount Small   Drainage Characteristics/Odor Serosanguineous   Appearance Red;Yellow;Moist;Adipose   Tissue loss description Full thickness   Red (%), Wound Tissue Color 50 %   Yellow (%), Wound Tissue Color 50 %   Periwound Area Intact   Wound Edges Open   Care Cleansed with:;Sterile normal saline;Applied:;Skin Barrier   Dressing Foam;Applied   Dressing Change Due 06/30/20        Altered Skin Integrity 06/26/20 0833 Right Coccyx #6 Full thickness tissue loss. Subcutaneous fat may be visible but bone, tendon or muscle are not exposed   Date First Assessed/Time First Assessed: 06/26/20 0833   Altered Skin Integrity Present on Admission: yes  Side: Right  Location: Coccyx  Wound Number (optional): #6  Is this injury device related?: No  Description of Altered Skin Integrity: Full thic...   Description of Altered Skin Integrity Full thickness tissue loss. Subcutaneous fat may be visible but bone, tendon or muscle are not exposed   Dressing Appearance Intact   Drainage Amount Small   Drainage Characteristics/Odor Serosanguineous   Appearance Red;Yellow;Moist;Adipose   Tissue loss description Full thickness   Red (%), Wound Tissue Color 50 %   Yellow (%), Wound Tissue Color 50 %   Periwound Area Intact   Wound Edges Open   Care Cleansed with:;Sterile normal saline;Applied:;Skin Barrier   Dressing Foam;Applied   Dressing Change Due 06/30/20        Altered Skin Integrity 06/26/20 0833 Right medial Buttocks #7 Full thickness tissue loss. Subcutaneous fat may be visible but bone, tendon or muscle are not exposed   Date First Assessed/Time First Assessed: 06/26/20 0833   Altered Skin Integrity Present on Admission: yes  Side: Right  Orientation: medial  Location: Buttocks  Wound Number (optional): #7  Is this injury device related?: No   Description of Altered Sk...   Description of Altered Skin Integrity Full thickness tissue loss. Subcutaneous fat may be visible but bone, tendon or muscle are not exposed   Dressing Appearance Intact   Drainage Amount Scant   Drainage Characteristics/Odor Serosanguineous   Appearance Red;Yellow;Moist;Adipose   Tissue loss description Full thickness   Red (%), Wound Tissue Color 50 %   Yellow (%), Wound Tissue Color 50 %   Periwound Area Intact   Wound Edges Open   Care Cleansed with:;Sterile normal saline;Applied:;Skin Barrier   Dressing Foam;Applied   Dressing Change Due 06/30/20     Consulted on this 86 y/o F patient due to multiple present on admission pressure injuries to sacral/coccygeal/bilateral buttock region. Patient admitted with COVID-19 virus from NH and has PMH significant for HTN, DM, GERD, OA, dementia. She is awake and alert. Bilateral heels assessed with no redness, skin is dry and flaky, boggy to touch. She has 2 dry healing wounds noted to right face near mouth, unknown etiology. Patient turned to left side with min assistance. Sacral foam dressing removed. Sacrum, coccyx, bilateral buttock region assessed. Skin is pale to region, mildly macerated, and scar tissue noted to entire area, within this are 7 open full thickness wounds consistent with stage 3 pressure injuries. Likely originated as MASD/IAD that evolved into pressure injuries. Cleansed all gently with bath wipes. All wound beds are moist pink/red and yellow subcutaneous tissue, no slough. Largest measures 3x2x0.3cm, entire effected area measures 8x12x0.3cm. Picture taken with Haiku, however did not upload/save into chart so no picture is available at this time. Critic aid paste applied to wounds and tommie wound skin due to moisture, then all covered with large sacral foam dressing for friction/shear reduction and absorption of drainage. Patient placed on waffle overlay at this time and inflated per 's recommendations, sacral  hand check performed with appropriate offloading noted. Recommend turn q2h, elevate heels.     Multiple stage 3 pressure injuries to sacral/coccygeal/bialteral buttock region:  1. Cleanse with saline  2. Pat dry  3. Apply thin layer critic aid paste moisture barrier  4. Cover all with large sacral foam dressing  5. Change twice weekly (Tu/Fri) and prn excess drainage

## 2020-06-26 NOTE — ASSESSMENT & PLAN NOTE
- COVID-19 testing: positive   - Infection Control notified     - Isolation:   - Airborne, Contact and Droplet Precautions  - Cohort patients into COVID units  - N95 mask, wear eye protection  - 20 second hand hygiene              - Limit visitors per hospital policy              - Consolidating lab draws, nursing care, provider visits, and interventions    - Diagnostics: (leukopenia, hyponatremia, hyperferritinemia, elevated troponin, elevated d-dimer, age, and comorbidities are significant predictors of poor clinical outcome)  CBC, CMP, Ferritin, CRP and Portable CXR    - Management:  Supplemental O2 to maintain SpO2 >92%  Telemetry  Continuous/intermittent Pulse Ox  Albuterol treatment PRN  Pharmacy Consult for Remdesivir Pending  Add Doxy and Ceft (PCT+)  Continue to monitor

## 2020-06-26 NOTE — NURSING
Pt had opportunity to review consent. Verbal information regarding procedure for PICC placement, risks, and benefits provided to patient. She requested I talk to her steve Oconnor. Called Anastasia at [phone] and provided the same information to her. Anastasia agreed to PICC line and called pt to discuss. Pt is now agreeable to getting PICC line. Verbal consent done with 2 nurses: myself and Bernice Corcoran RN. Notified CN.

## 2020-06-26 NOTE — PROGRESS NOTES
[facility] Hospital Medicine  Progress Note    Patient Name: Hilary Esqueda  MRN: [id number]  Patient Class: IP- Inpatient   Admission Date: 6/25/2020  Length of Stay: 1 days  Attending Physician: Mauro Contreras MD  Primary Care Provider: Afua Hector MD        Subjective:     Principal Problem:COVID-19        HPI:  Mrs. Esqueda is an 88 yo AAF with Hx of HTN, DM, GERD, OA, Mild Dementia who comes from NH for evaluation of SOB. Per patient she reports that for the past week she has not been feeling well. She endorses chills, cough, SOB, nausea. Notes that she had two negative COVID test during this time. Today she was unable to catch her breath and required oxygen. In ED patient noted to be hypoxic, with COVID+ and CXR concerning for PNA and admitted for further management.       Overview/Hospital Course:  6/26  Patient seen at bedside this morning, resting comfortably. Reports she was cold overnight. Notes he cough and SOB remain stable. Continues to report pain in her knee, discussed adding Tramadol for pain and she agreed. No other acute events in the last 24 hours.     No past medical history on file.    No past surgical history on file.    Review of patient's allergies indicates:   Allergen Reactions    Ace inhibitors Anaphylaxis    Amoxicillin Itching    Codeine Other (See Comments)     confusion    Penicillins Itching    Sulfa (sulfonamide antibiotics) Rash       No current facility-administered medications on file prior to encounter.      Current Outpatient Medications on File Prior to Encounter   Medication Sig    amlodipine (NORVASC) 10 MG tablet     diclofenac sodium (VOLTAREN) 1 % Gel Apply 4 g topically 3 (three) times daily.    gabapentin (NEURONTIN) 100 MG capsule     glipiZIDE (GLUCOTROL) 10 MG tablet     hydrochlorothiazide (HYDRODIURIL) 25 MG tablet     metformin (GLUCOPHAGE) 500 MG tablet     naproxen (NAPROSYN) 375 MG tablet     oxybutynin (DITROPAN-XL) 5  MG TR24     ranitidine (ZANTAC) 150 MG tablet      Family History     None        Tobacco Use    Smoking status: Former Smoker     Types: Cigarettes     Quit date: 1993     Years since quittin.2    Smokeless tobacco: Never Used   Substance and Sexual Activity    Alcohol use: No    Drug use: No    Sexual activity: Never     Partners: Male     Review of Systems   Constitutional: Positive for activity change, chills and fatigue.   HENT: Negative for congestion, ear pain, rhinorrhea and sore throat.    Respiratory: Positive for cough, chest tightness and shortness of breath.    Cardiovascular: Negative for chest pain.   Gastrointestinal: Positive for nausea. Negative for abdominal pain, diarrhea and vomiting.   Musculoskeletal: Positive for arthralgias.   Neurological: Negative for headaches.     Objective:     Vital Signs (Most Recent):  Temp: 98.2 °F (36.8 °C) (20 08)  Pulse: 83 (20 08)  Resp: 16 (20)  BP: 137/60 (20 08)  SpO2: 97 % (20) Vital Signs (24h Range):  Temp:  [97.9 °F (36.6 °C)-98.9 °F (37.2 °C)] 98.2 °F (36.8 °C)  Pulse:  [77-91] 83  Resp:  [16-34] 16  SpO2:  [89 %-100 %] 97 %  BP: (114-153)/(51-81) 137/60     Weight: 73.9 kg (162 lb 14.7 oz)  Body mass index is 27.97 kg/m².    Physical Exam  Vitals signs and nursing note reviewed.   Constitutional:       Appearance: She is well-developed. She is ill-appearing.   HENT:      Head: Normocephalic and atraumatic.      Right Ear: External ear normal.      Left Ear: External ear normal.      Nose: Nose normal.   Eyes:      Conjunctiva/sclera: Conjunctivae normal.      Pupils: Pupils are equal, round, and reactive to light.   Neck:      Musculoskeletal: Normal range of motion and neck supple.      Thyroid: No thyromegaly.      Vascular: No JVD.   Cardiovascular:      Rate and Rhythm: Normal rate and regular rhythm.      Heart sounds: Normal heart sounds. No murmur. No friction rub. No gallop.     Pulmonary:      Effort: Pulmonary effort is normal. No respiratory distress.      Breath sounds: Normal breath sounds. No stridor. No wheezing or rales.   Chest:      Chest wall: No tenderness.   Abdominal:      General: Bowel sounds are normal. There is no distension.      Palpations: Abdomen is soft. There is no mass.      Tenderness: There is no abdominal tenderness. There is no guarding or rebound.   Genitourinary:     Vagina: Normal. No vaginal discharge.   Musculoskeletal: Normal range of motion.         General: No deformity.   Lymphadenopathy:      Cervical: No cervical adenopathy.   Skin:     General: Skin is warm.      Capillary Refill: Capillary refill takes less than 2 seconds.      Findings: No erythema or rash.   Neurological:      Mental Status: She is alert and oriented to person, place, and time.      Cranial Nerves: No cranial nerve deficit.      Sensory: No sensory deficit.      Deep Tendon Reflexes: Reflexes normal.   Psychiatric:         Behavior: Behavior normal.           CRANIAL NERVES     CN III, IV, VI   Pupils are equal, round, and reactive to light.       Significant Labs: All pertinent labs within the past 24 hours have been reviewed.    Significant Imaging: I have reviewed and interpreted all pertinent imaging results/findings within the past 24 hours.      Assessment/Plan:      * COVID-19  - COVID-19 testing: positive   - Infection Control notified     - Isolation:   - Airborne, Contact and Droplet Precautions  - Cohort patients into COVID units  - N95 mask, wear eye protection  - 20 second hand hygiene              - Limit visitors per hospital policy              - Consolidating lab draws, nursing care, provider visits, and interventions    - Diagnostics: (leukopenia, hyponatremia, hyperferritinemia, elevated troponin, elevated d-dimer, age, and comorbidities are significant predictors of poor clinical outcome)  CBC, CMP, Ferritin, CRP and Portable CXR    -  Management:  Supplemental O2 to maintain SpO2 >92%  Telemetry  Continuous/intermittent Pulse Ox  Albuterol treatment PRN  Pharmacy Consult for Remdesivir Pending  Add Doxy and Ceft (PCT+)  Continue to monitor                SOB (shortness of breath)  2/2 COVID   Treatment as above       Acute hypoxemic respiratory failure  Monitor Respiratory Status  Supplemental Oxygen keep O2 Sat >92%        Gastroesophageal reflux disease without esophagitis  Famotidine 20mg     Essential hypertension  Monitor BP  Norvasc 10mg       Diabetes mellitus without complication  Monitor F/S  Diabetic Diet  ISS        VTE Risk Mitigation (From admission, onward)    None                Mauro Contreras MD  Department of Hospital Medicine   [facility]

## 2020-06-26 NOTE — NURSING
Notified Dr. Contreras that pt's has no IV access and has not been able to receive IV Rocephin yet. Per report, ER tried several times with ultrasound, unsuccessfully. Received order for PICC line. Provided pt with copy of consent to review. Will discuss shortly.     Ok'd with Dr. Contreras to do a straight cath in order to collect urine sample if unable to obtain clean catch.

## 2020-06-26 NOTE — PLAN OF CARE
Plan of care reviewed with patient at bedside; verbalized understanding.  Pt is AAOX4 and VSS.  Pt remained afebrile throughout this shift.   AC/HS blood glucose monitoring; ISS per orders  Pt denies pain this shift.  Pharm consult to determine use of remdesivir.   CXR concerning for PNA.   Pt moving/turing independent. Frequent weight shifting encouraged.  Bed low, side rails up x 2, wheels locked, call light in reach.  PIV intact. Cardiac monitor in place.  No c/o or questions at this time.   Will continue to monitor.

## 2020-06-27 LAB
ALBUMIN SERPL BCP-MCNC: 1.7 G/DL (ref 3.5–5.2)
ALP SERPL-CCNC: 94 U/L (ref 55–135)
ALT SERPL W/O P-5'-P-CCNC: 10 U/L (ref 10–44)
ANION GAP SERPL CALC-SCNC: 7 MMOL/L (ref 8–16)
AST SERPL-CCNC: 11 U/L (ref 10–40)
BILIRUB SERPL-MCNC: 0.2 MG/DL (ref 0.1–1)
BUN SERPL-MCNC: 17 MG/DL (ref 8–23)
CALCIUM SERPL-MCNC: 8 MG/DL (ref 8.7–10.5)
CHLORIDE SERPL-SCNC: 109 MMOL/L (ref 95–110)
CO2 SERPL-SCNC: 23 MMOL/L (ref 23–29)
CREAT SERPL-MCNC: 0.7 MG/DL (ref 0.5–1.4)
EST. GFR  (AFRICAN AMERICAN): >60 ML/MIN/1.73 M^2
EST. GFR  (NON AFRICAN AMERICAN): >60 ML/MIN/1.73 M^2
GLUCOSE SERPL-MCNC: 148 MG/DL (ref 70–110)
POCT GLUCOSE: 151 MG/DL (ref 70–110)
POCT GLUCOSE: 204 MG/DL (ref 70–110)
POCT GLUCOSE: 212 MG/DL (ref 70–110)
POTASSIUM SERPL-SCNC: 3.6 MMOL/L (ref 3.5–5.1)
PROT SERPL-MCNC: 5.6 G/DL (ref 6–8.4)
SODIUM SERPL-SCNC: 139 MMOL/L (ref 136–145)

## 2020-06-27 PROCEDURE — 63600175 PHARM REV CODE 636 W HCPCS: Mod: JG | Performed by: INTERNAL MEDICINE

## 2020-06-27 PROCEDURE — 94761 N-INVAS EAR/PLS OXIMETRY MLT: CPT

## 2020-06-27 PROCEDURE — 80053 COMPREHEN METABOLIC PANEL: CPT

## 2020-06-27 PROCEDURE — 27000221 HC OXYGEN, UP TO 24 HOURS

## 2020-06-27 PROCEDURE — 25000003 PHARM REV CODE 250: Performed by: INTERNAL MEDICINE

## 2020-06-27 PROCEDURE — 21400001 HC TELEMETRY ROOM

## 2020-06-27 PROCEDURE — 96372 THER/PROPH/DIAG INJ SC/IM: CPT

## 2020-06-27 PROCEDURE — 63600175 PHARM REV CODE 636 W HCPCS: Mod: JG | Performed by: NURSE PRACTITIONER

## 2020-06-27 RX ORDER — FAMOTIDINE 20 MG/1
20 TABLET, FILM COATED ORAL 2 TIMES DAILY
Status: DISCONTINUED | OUTPATIENT
Start: 2020-06-27 | End: 2020-07-01 | Stop reason: HOSPADM

## 2020-06-27 RX ADMIN — ALTEPLASE 2 MG: 2.2 INJECTION, POWDER, LYOPHILIZED, FOR SOLUTION INTRAVENOUS at 02:06

## 2020-06-27 RX ADMIN — FAMOTIDINE 20 MG: 20 TABLET ORAL at 09:06

## 2020-06-27 RX ADMIN — INSULIN ASPART 4 UNITS: 100 INJECTION, SOLUTION INTRAVENOUS; SUBCUTANEOUS at 12:06

## 2020-06-27 RX ADMIN — OXYBUTYNIN CHLORIDE 5 MG: 5 TABLET, EXTENDED RELEASE ORAL at 10:06

## 2020-06-27 RX ADMIN — DOXYCYCLINE HYCLATE 100 MG: 100 TABLET, COATED ORAL at 10:06

## 2020-06-27 RX ADMIN — INSULIN ASPART 2 UNITS: 100 INJECTION, SOLUTION INTRAVENOUS; SUBCUTANEOUS at 06:06

## 2020-06-27 RX ADMIN — GABAPENTIN 100 MG: 100 CAPSULE ORAL at 09:06

## 2020-06-27 RX ADMIN — AMLODIPINE BESYLATE 10 MG: 10 TABLET ORAL at 10:06

## 2020-06-27 RX ADMIN — ACETAMINOPHEN 650 MG: 325 TABLET ORAL at 06:06

## 2020-06-27 RX ADMIN — SODIUM CHLORIDE 200 MG: 0.9 INJECTION, SOLUTION INTRAVENOUS at 04:06

## 2020-06-27 RX ADMIN — ALTEPLASE 2 MG: 2.2 INJECTION, POWDER, LYOPHILIZED, FOR SOLUTION INTRAVENOUS at 04:06

## 2020-06-27 RX ADMIN — DOXYCYCLINE HYCLATE 100 MG: 100 TABLET, COATED ORAL at 09:06

## 2020-06-27 RX ADMIN — FAMOTIDINE 20 MG: 20 TABLET ORAL at 10:06

## 2020-06-27 RX ADMIN — CEFTRIAXONE 1 G: 1 INJECTION, SOLUTION INTRAVENOUS at 06:06

## 2020-06-27 RX ADMIN — INSULIN ASPART 2 UNITS: 100 INJECTION, SOLUTION INTRAVENOUS; SUBCUTANEOUS at 10:06

## 2020-06-27 NOTE — NURSING
PICC line placed to JEANE by PICC nurse. Waiting on xray confirmation before use. Rescheduled ceftriaxone and remdesivir to 20:00 so they can be given by night nurse. Discussed this plan w/ Zulma MICHAUD. She will give meds once xray confirms placement. Dr. Contreras was notified earlier of the delay in med administration.

## 2020-06-27 NOTE — PROGRESS NOTES
[facility] Hospital Medicine  Progress Note    Patient Name: Hilayr Esqueda  MRN: [id number]  Patient Class: IP- Inpatient   Admission Date: 6/25/2020  Length of Stay: 2 days  Attending Physician: Mauro Contreras MD  Primary Care Provider: Afua Hector MD        Subjective:     Principal Problem:COVID-19        HPI:  Mrs. Esqueda is an 86 yo AAF with Hx of HTN, DM, GERD, OA, Mild Dementia who comes from NH for evaluation of SOB. Per patient she reports that for the past week she has not been feeling well. She endorses chills, cough, SOB, nausea. Notes that she had two negative COVID test during this time. Today she was unable to catch her breath and required oxygen. In ED patient noted to be hypoxic, with COVID+ and CXR concerning for PNA and admitted for further management.       Overview/Hospital Course:  6/26  Patient seen at bedside this morning, resting comfortably. Reports she was cold overnight. Notes he cough and SOB remain stable. Continues to report pain in her knee, discussed adding Tramadol for pain and she agreed. No other acute events in the last 24 hours.   6/27  Patient seen this morning at bedside, resting comfortably. She started Remdesivir yesterday. Also reports Tramadol is helping for pain. No acute events in the last 24 hours.     No past medical history on file.    No past surgical history on file.    Review of patient's allergies indicates:   Allergen Reactions    Ace inhibitors Anaphylaxis    Amoxicillin Itching    Codeine Other (See Comments)     confusion    Penicillins Itching    Sulfa (sulfonamide antibiotics) Rash       No current facility-administered medications on file prior to encounter.      Current Outpatient Medications on File Prior to Encounter   Medication Sig    amlodipine (NORVASC) 10 MG tablet     diclofenac sodium (VOLTAREN) 1 % Gel Apply 4 g topically 3 (three) times daily.    gabapentin (NEURONTIN) 100 MG capsule     glipiZIDE  (GLUCOTROL) 10 MG tablet     hydrochlorothiazide (HYDRODIURIL) 25 MG tablet     metformin (GLUCOPHAGE) 500 MG tablet     naproxen (NAPROSYN) 375 MG tablet     oxybutynin (DITROPAN-XL) 5 MG TR24     ranitidine (ZANTAC) 150 MG tablet      Family History     None        Tobacco Use    Smoking status: Former Smoker     Types: Cigarettes     Quit date: 1993     Years since quittin.2    Smokeless tobacco: Never Used   Substance and Sexual Activity    Alcohol use: No    Drug use: No    Sexual activity: Never     Partners: Male     Review of Systems   Constitutional: Negative for activity change, chills and fatigue.   HENT: Negative for congestion, ear pain, rhinorrhea and sore throat.    Respiratory: Negative for cough, chest tightness and shortness of breath.    Cardiovascular: Negative for chest pain.   Gastrointestinal: Negative for abdominal pain, diarrhea, nausea and vomiting.   Musculoskeletal: Positive for arthralgias.   Neurological: Negative for headaches.     Objective:     Vital Signs (Most Recent):  Temp: 98 °F (36.7 °C) (20 0818)  Pulse: 74 (20 0818)  Resp: 18 (20 0818)  BP: (!) 144/61 (20 0818)  SpO2: 98 % (20 0900) Vital Signs (24h Range):  Temp:  [98 °F (36.7 °C)-98.3 °F (36.8 °C)] 98 °F (36.7 °C)  Pulse:  [66-92] 74  Resp:  [18-20] 18  SpO2:  [94 %-99 %] 98 %  BP: ()/(52-61) 144/61     Weight: 98.4 kg (216 lb 14.9 oz)  Body mass index is 37.24 kg/m².    Physical Exam  Vitals signs and nursing note reviewed.   Constitutional:       Appearance: She is well-developed. She is ill-appearing.   HENT:      Head: Normocephalic and atraumatic.      Right Ear: External ear normal.      Left Ear: External ear normal.      Nose: Nose normal.   Eyes:      Conjunctiva/sclera: Conjunctivae normal.      Pupils: Pupils are equal, round, and reactive to light.   Neck:      Musculoskeletal: Normal range of motion and neck supple.      Thyroid: No thyromegaly.       Vascular: No JVD.   Cardiovascular:      Rate and Rhythm: Normal rate and regular rhythm.      Heart sounds: Normal heart sounds. No murmur. No friction rub. No gallop.    Pulmonary:      Effort: Pulmonary effort is normal. No respiratory distress.      Breath sounds: Normal breath sounds. No stridor. No wheezing or rales.   Chest:      Chest wall: No tenderness.   Abdominal:      General: Bowel sounds are normal. There is no distension.      Palpations: Abdomen is soft. There is no mass.      Tenderness: There is no abdominal tenderness. There is no guarding or rebound.   Genitourinary:     Vagina: Normal. No vaginal discharge.   Musculoskeletal: Normal range of motion.         General: No deformity.   Lymphadenopathy:      Cervical: No cervical adenopathy.   Skin:     General: Skin is warm.      Capillary Refill: Capillary refill takes less than 2 seconds.      Findings: No erythema or rash.   Neurological:      Mental Status: She is alert and oriented to person, place, and time.      Cranial Nerves: No cranial nerve deficit.      Sensory: No sensory deficit.      Deep Tendon Reflexes: Reflexes normal.   Psychiatric:         Behavior: Behavior normal.           CRANIAL NERVES     CN III, IV, VI   Pupils are equal, round, and reactive to light.       Significant Labs: All pertinent labs within the past 24 hours have been reviewed.    Significant Imaging: I have reviewed and interpreted all pertinent imaging results/findings within the past 24 hours.      Assessment/Plan:      * COVID-19  - COVID-19 testing: positive   - Infection Control notified     - Isolation:   - Airborne, Contact and Droplet Precautions  - Cohort patients into COVID units  - N95 mask, wear eye protection  - 20 second hand hygiene              - Limit visitors per hospital policy              - Consolidating lab draws, nursing care, provider visits, and interventions    - Diagnostics: (leukopenia, hyponatremia, hyperferritinemia, elevated  troponin, elevated d-dimer, age, and comorbidities are significant predictors of poor clinical outcome)  CBC, CMP, Ferritin, CRP and Portable CXR    - Management:  Supplemental O2 to maintain SpO2 >92%  Telemetry  Continuous/intermittent Pulse Ox  Albuterol treatment PRN  Pharmacy Consult for Remdesivir Pending  Add Doxy and Ceft (PCT+)  Continue to monitor        6/27   Day 2 of 5 of Remdesivir  Continue rest of care         SOB (shortness of breath)  2/2 COVID   Treatment as above       Acute hypoxemic respiratory failure  Monitor Respiratory Status  Supplemental Oxygen keep O2 Sat >92%        Gastroesophageal reflux disease without esophagitis  Famotidine 20mg     Essential hypertension  Monitor BP  Norvasc 10mg       Diabetes mellitus without complication  Monitor F/S  Diabetic Diet  ISS        VTE Risk Mitigation (From admission, onward)    None                Mauro Contreras MD  Department of Hospital Medicine   [facility]

## 2020-06-27 NOTE — SUBJECTIVE & OBJECTIVE
No past medical history on file.    No past surgical history on file.    Review of patient's allergies indicates:   Allergen Reactions    Ace inhibitors Anaphylaxis    Amoxicillin Itching    Codeine Other (See Comments)     confusion    Penicillins Itching    Sulfa (sulfonamide antibiotics) Rash       No current facility-administered medications on file prior to encounter.      Current Outpatient Medications on File Prior to Encounter   Medication Sig    amlodipine (NORVASC) 10 MG tablet     diclofenac sodium (VOLTAREN) 1 % Gel Apply 4 g topically 3 (three) times daily.    gabapentin (NEURONTIN) 100 MG capsule     glipiZIDE (GLUCOTROL) 10 MG tablet     hydrochlorothiazide (HYDRODIURIL) 25 MG tablet     metformin (GLUCOPHAGE) 500 MG tablet     naproxen (NAPROSYN) 375 MG tablet     oxybutynin (DITROPAN-XL) 5 MG TR24     ranitidine (ZANTAC) 150 MG tablet      Family History     None        Tobacco Use    Smoking status: Former Smoker     Types: Cigarettes     Quit date: 1993     Years since quittin.2    Smokeless tobacco: Never Used   Substance and Sexual Activity    Alcohol use: No    Drug use: No    Sexual activity: Never     Partners: Male     Review of Systems   Constitutional: Negative for activity change, chills and fatigue.   HENT: Negative for congestion, ear pain, rhinorrhea and sore throat.    Respiratory: Negative for cough, chest tightness and shortness of breath.    Cardiovascular: Negative for chest pain.   Gastrointestinal: Negative for abdominal pain, diarrhea, nausea and vomiting.   Musculoskeletal: Positive for arthralgias.   Neurological: Negative for headaches.     Objective:     Vital Signs (Most Recent):  Temp: 98 °F (36.7 °C) (20 08)  Pulse: 74 (20 08)  Resp: 18 (20 08)  BP: (!) 144/61 (20 08)  SpO2: 98 % (20 0900) Vital Signs (24h Range):  Temp:  [98 °F (36.7 °C)-98.3 °F (36.8 °C)] 98 °F (36.7 °C)  Pulse:  [66-92] 74  Resp:   [18-20] 18  SpO2:  [94 %-99 %] 98 %  BP: ()/(52-61) 144/61     Weight: 98.4 kg (216 lb 14.9 oz)  Body mass index is 37.24 kg/m².    Physical Exam  Vitals signs and nursing note reviewed.   Constitutional:       Appearance: She is well-developed. She is ill-appearing.   HENT:      Head: Normocephalic and atraumatic.      Right Ear: External ear normal.      Left Ear: External ear normal.      Nose: Nose normal.   Eyes:      Conjunctiva/sclera: Conjunctivae normal.      Pupils: Pupils are equal, round, and reactive to light.   Neck:      Musculoskeletal: Normal range of motion and neck supple.      Thyroid: No thyromegaly.      Vascular: No JVD.   Cardiovascular:      Rate and Rhythm: Normal rate and regular rhythm.      Heart sounds: Normal heart sounds. No murmur. No friction rub. No gallop.    Pulmonary:      Effort: Pulmonary effort is normal. No respiratory distress.      Breath sounds: Normal breath sounds. No stridor. No wheezing or rales.   Chest:      Chest wall: No tenderness.   Abdominal:      General: Bowel sounds are normal. There is no distension.      Palpations: Abdomen is soft. There is no mass.      Tenderness: There is no abdominal tenderness. There is no guarding or rebound.   Genitourinary:     Vagina: Normal. No vaginal discharge.   Musculoskeletal: Normal range of motion.         General: No deformity.   Lymphadenopathy:      Cervical: No cervical adenopathy.   Skin:     General: Skin is warm.      Capillary Refill: Capillary refill takes less than 2 seconds.      Findings: No erythema or rash.   Neurological:      Mental Status: She is alert and oriented to person, place, and time.      Cranial Nerves: No cranial nerve deficit.      Sensory: No sensory deficit.      Deep Tendon Reflexes: Reflexes normal.   Psychiatric:         Behavior: Behavior normal.           CRANIAL NERVES     CN III, IV, VI   Pupils are equal, round, and reactive to light.       Significant Labs: All pertinent labs  within the past 24 hours have been reviewed.    Significant Imaging: I have reviewed and interpreted all pertinent imaging results/findings within the past 24 hours.

## 2020-06-27 NOTE — PLAN OF CARE
Bed remains low and locked, side rails up x 2, call bell and belongings within reach.    Clutter removed from room, lighting adjusted when rounding.    Frequent repositioning done to prevent pressure injury. Waffle mattress overlay applied to bed.    Contact, droplet, and airborne isolation in place.    No IV access at this time. Waiting for PICC line to be placed.     Blood glucose checked before meals and at bedtime, insulin administered as ordered when trays on unit.    Pain frequently monitored, interventions implemented as ordered and appropriate. Pt declined opioid pain medications today. Repositioned pt for comfort.    VS taken Q4 hours per unit routine.     Cardiac monitor in place: NSR.    Educated pt on plan of care. Pt is agreeable and verbalized understanding. Will continue to monitor.

## 2020-06-27 NOTE — PLAN OF CARE
Plan of care reviewed with patient at bedside; verbalized understanding.  Pt is AAOX4 and VSS.  Pt remained afebrile throughout this shift.   AC/HS blood glucose monitoring; ISS per orders  PRN medication administered for headache.  Patient's pain managed with scheduled medications.   Pt refuses turns due to pain in R knee/leg with movement. Frequent weight shifting encouraged and provided.   Bed low, side rails up x 2, wheels locked, call light in reach.  PICC placed on 06/26/2020 due to poor venous access. Awaiting definitive confirmation to use PICC for therapy. Unable to administer IV medications this shift.    Cardiac monitor in place.  No c/o or questions at this time.   Will continue to monitor.

## 2020-06-27 NOTE — ASSESSMENT & PLAN NOTE
- COVID-19 testing: positive   - Infection Control notified     - Isolation:   - Airborne, Contact and Droplet Precautions  - Cohort patients into COVID units  - N95 mask, wear eye protection  - 20 second hand hygiene              - Limit visitors per hospital policy              - Consolidating lab draws, nursing care, provider visits, and interventions    - Diagnostics: (leukopenia, hyponatremia, hyperferritinemia, elevated troponin, elevated d-dimer, age, and comorbidities are significant predictors of poor clinical outcome)  CBC, CMP, Ferritin, CRP and Portable CXR    - Management:  Supplemental O2 to maintain SpO2 >92%  Telemetry  Continuous/intermittent Pulse Ox  Albuterol treatment PRN  Pharmacy Consult for Remdesivir Pending  Add Doxy and Ceft (PCT+)  Continue to monitor         6/27   Day 2 of 5 of Remdesivir  Continue rest of care

## 2020-06-27 NOTE — PROCEDURES
"Hilary Esqueda is a 87 y.o. female patient.    Temp: 98.3 °F (36.8 °C) (06/26/20 1936)  Pulse: 74 (06/26/20 1936)  Resp: 20 (06/26/20 1936)  BP: (!) 110/53 (06/26/20 1936)  SpO2: 96 % (06/26/20 1936)  Weight: 73.9 kg (162 lb 14.7 oz) (06/26/20 0400)  Height: 5' 4" (162.6 cm) (06/25/20 1703)    PICC  Date/Time: 6/26/2020 6:19 PM  Performed by: Ian Mederos RN  Supervising provider: Sue Nation RN  Consent Done: Yes  Time out: Immediately prior to procedure a time out was called to verify the correct patient, procedure, equipment, support staff and site/side marked as required  Indications: vascular access  Anesthesia: local infiltration  Local anesthetic: lidocaine 1% without epinephrine  Anesthetic Total (mL): 3  Preparation: skin prepped with ChloraPrep  Skin prep agent dried: skin prep agent completely dried prior to procedure  Sterile barriers: all five maximum sterile barriers used - cap, mask, sterile gown, sterile gloves, and large sterile sheet  Hand hygiene: hand hygiene performed prior to central venous catheter insertion  Location details: right basilic  Catheter type: double lumen  Catheter size: 5 Fr  Catheter Length: 41cm    Ultrasound guidance: yes  Vessel Caliber: medium and patent, compressibility normal  Vascular Doppler: not done  Needle advanced into vessel with real time Ultrasound guidance.  Guidewire confirmed in vessel.  Sterile sheath used.  no esophageal manometryNumber of attempts: 1  Post-procedure: blood return through all ports, chlorhexidine patch and sterile dressing applied  Estimated blood loss (mL): 0  Specimens: No  Implants: No  Assessment: placement verified by x-ray  Tip Termination Explanation: see rad. report  Complications: none  Comments: Do not use until placement verified by MD Ian Mederos  6/26/2020  "

## 2020-06-27 NOTE — PROCEDURES
"Hilary Esqueda is a 87 y.o. female patient.    Temp: 98.3 °F (36.8 °C) (06/26/20 1936)  Pulse: 74 (06/26/20 1936)  Resp: 20 (06/26/20 1936)  BP: (!) 110/53 (06/26/20 1936)  SpO2: 96 % (06/26/20 1936)  Weight: 73.9 kg (162 lb 14.7 oz) (06/26/20 0400)  Height: 5' 4" (162.6 cm) (06/25/20 1703)    PICC  Date/Time: 6/26/2020 8:16 PM  Performed by: Ian Mederos RN  Consent Done: Yes  Skin prep agent dried: skin prep agent completely dried prior to procedure  Location details: right basilic  Catheter type: double lumen  Catheter size: 5 Fr  Catheter Length: 41cm      Comments: Radiology report reads tip at SVC atrium junction with recommendation of 5cm retraction.  Discussed report with Dr Khalil Order received by Dr Khalil to retract picc 5cm as recommended.  Picc retracted x 5cm external report to Zulma.            Ian Mederos  6/26/2020  "

## 2020-06-27 NOTE — PROGRESS NOTES
Pharmacist Renal Dose Adjustment Note    Hilary Esqueda is a 87 y.o. female being treated with the medication famotidine.    Patient Data:    Vital Signs (Most Recent):  Temp: 97.7 °F (36.5 °C) (06/27/20 1125)  Pulse: 76 (06/27/20 1125)  Resp: 18 (06/27/20 1125)  BP: (!) 114/55 (06/27/20 1125)  SpO2: 98 % (06/27/20 1125)   Vital Signs (72h Range):  Temp:  [97.7 °F (36.5 °C)-98.9 °F (37.2 °C)]   Pulse:  [66-92]   Resp:  [16-34]   BP: ()/(51-81)   SpO2:  [89 %-100 %]      Recent Labs   Lab 06/25/20  1156 06/26/20  0425 06/27/20  0620   CREATININE 1.0 0.9 0.7     Serum creatinine: 0.7 mg/dL 06/27/20 0620  Estimated creatinine clearance: 64.5 mL/min    Medication famotidine 20 mg PO daily will be changed to famotidine 20 mg PO BID per pharmacy renal dose adjustment protocol for patients with CrCl greater than 50 mL/min.    Pharmacist's Name: Concepcion Del Valle PharmD  Pharmacist's Extension: [phone]    Thank you for allowing us to participate in this patient's care.     Concepcion Del Valle PharmD 06/27/2020 2:41 PM

## 2020-06-27 NOTE — PLAN OF CARE
Patient continues to be monitored and treated for acute and chronic conditions. Plan of care updated and reviewed with patient. Isolation precautions in place. Cardiac monitor in place. Patient on oxygen via nasal cannula. PICC not able to be used at this time; treatment being pursued. Q2H turns enforced and skin care provided. Safety precautions in place, including call light in reach. Will continue to monitor and treat.

## 2020-06-28 LAB
ALBUMIN SERPL BCP-MCNC: 1.7 G/DL (ref 3.5–5.2)
ALP SERPL-CCNC: 98 U/L (ref 55–135)
ALT SERPL W/O P-5'-P-CCNC: 10 U/L (ref 10–44)
ANION GAP SERPL CALC-SCNC: 5 MMOL/L (ref 8–16)
AST SERPL-CCNC: 10 U/L (ref 10–40)
BILIRUB SERPL-MCNC: 0.2 MG/DL (ref 0.1–1)
BUN SERPL-MCNC: 20 MG/DL (ref 8–23)
CALCIUM SERPL-MCNC: 7.8 MG/DL (ref 8.7–10.5)
CHLORIDE SERPL-SCNC: 112 MMOL/L (ref 95–110)
CO2 SERPL-SCNC: 23 MMOL/L (ref 23–29)
CREAT SERPL-MCNC: 0.7 MG/DL (ref 0.5–1.4)
EST. GFR  (AFRICAN AMERICAN): >60 ML/MIN/1.73 M^2
EST. GFR  (NON AFRICAN AMERICAN): >60 ML/MIN/1.73 M^2
GLUCOSE SERPL-MCNC: 133 MG/DL (ref 70–110)
POCT GLUCOSE: 144 MG/DL (ref 70–110)
POCT GLUCOSE: 148 MG/DL (ref 70–110)
POCT GLUCOSE: 199 MG/DL (ref 70–110)
POCT GLUCOSE: 216 MG/DL (ref 70–110)
POCT GLUCOSE: 278 MG/DL (ref 70–110)
POTASSIUM SERPL-SCNC: 3.4 MMOL/L (ref 3.5–5.1)
PROT SERPL-MCNC: 5.6 G/DL (ref 6–8.4)
SODIUM SERPL-SCNC: 140 MMOL/L (ref 136–145)

## 2020-06-28 PROCEDURE — 21400001 HC TELEMETRY ROOM

## 2020-06-28 PROCEDURE — 97110 THERAPEUTIC EXERCISES: CPT

## 2020-06-28 PROCEDURE — 27000221 HC OXYGEN, UP TO 24 HOURS

## 2020-06-28 PROCEDURE — 94761 N-INVAS EAR/PLS OXIMETRY MLT: CPT

## 2020-06-28 PROCEDURE — 63600175 PHARM REV CODE 636 W HCPCS: Performed by: INTERNAL MEDICINE

## 2020-06-28 PROCEDURE — 97162 PT EVAL MOD COMPLEX 30 MIN: CPT

## 2020-06-28 PROCEDURE — 97530 THERAPEUTIC ACTIVITIES: CPT

## 2020-06-28 PROCEDURE — 99900035 HC TECH TIME PER 15 MIN (STAT)

## 2020-06-28 PROCEDURE — 36415 COLL VENOUS BLD VENIPUNCTURE: CPT

## 2020-06-28 PROCEDURE — 80053 COMPREHEN METABOLIC PANEL: CPT

## 2020-06-28 PROCEDURE — 97166 OT EVAL MOD COMPLEX 45 MIN: CPT

## 2020-06-28 PROCEDURE — 96372 THER/PROPH/DIAG INJ SC/IM: CPT

## 2020-06-28 PROCEDURE — 25000003 PHARM REV CODE 250: Performed by: INTERNAL MEDICINE

## 2020-06-28 RX ADMIN — AMLODIPINE BESYLATE 10 MG: 10 TABLET ORAL at 09:06

## 2020-06-28 RX ADMIN — DOXYCYCLINE HYCLATE 100 MG: 100 TABLET, COATED ORAL at 09:06

## 2020-06-28 RX ADMIN — FAMOTIDINE 20 MG: 20 TABLET ORAL at 09:06

## 2020-06-28 RX ADMIN — INSULIN ASPART 6 UNITS: 100 INJECTION, SOLUTION INTRAVENOUS; SUBCUTANEOUS at 12:06

## 2020-06-28 RX ADMIN — CEFTRIAXONE 1 G: 1 INJECTION, SOLUTION INTRAVENOUS at 06:06

## 2020-06-28 RX ADMIN — OXYBUTYNIN CHLORIDE 5 MG: 5 TABLET, EXTENDED RELEASE ORAL at 09:06

## 2020-06-28 RX ADMIN — GABAPENTIN 100 MG: 100 CAPSULE ORAL at 09:06

## 2020-06-28 RX ADMIN — INSULIN ASPART 2 UNITS: 100 INJECTION, SOLUTION INTRAVENOUS; SUBCUTANEOUS at 09:06

## 2020-06-28 RX ADMIN — SODIUM CHLORIDE 100 MG: 0.9 INJECTION, SOLUTION INTRAVENOUS at 04:06

## 2020-06-28 RX ADMIN — TRAMADOL HYDROCHLORIDE 50 MG: 50 TABLET, FILM COATED ORAL at 10:06

## 2020-06-28 NOTE — SUBJECTIVE & OBJECTIVE
No past medical history on file.    No past surgical history on file.    Review of patient's allergies indicates:   Allergen Reactions    Ace inhibitors Anaphylaxis    Amoxicillin Itching    Codeine Other (See Comments)     confusion    Penicillins Itching    Sulfa (sulfonamide antibiotics) Rash       No current facility-administered medications on file prior to encounter.      Current Outpatient Medications on File Prior to Encounter   Medication Sig    amlodipine (NORVASC) 10 MG tablet     diclofenac sodium (VOLTAREN) 1 % Gel Apply 4 g topically 3 (three) times daily.    gabapentin (NEURONTIN) 100 MG capsule     glipiZIDE (GLUCOTROL) 10 MG tablet     hydrochlorothiazide (HYDRODIURIL) 25 MG tablet     metformin (GLUCOPHAGE) 500 MG tablet     naproxen (NAPROSYN) 375 MG tablet     oxybutynin (DITROPAN-XL) 5 MG TR24     ranitidine (ZANTAC) 150 MG tablet      Family History     None        Tobacco Use    Smoking status: Former Smoker     Types: Cigarettes     Quit date: 1993     Years since quittin.2    Smokeless tobacco: Never Used   Substance and Sexual Activity    Alcohol use: No    Drug use: No    Sexual activity: Never     Partners: Male     Review of Systems   Constitutional: Negative for activity change, chills and fatigue.   HENT: Negative for congestion, ear pain, rhinorrhea and sore throat.    Respiratory: Negative for cough, chest tightness and shortness of breath.    Cardiovascular: Negative for chest pain.   Gastrointestinal: Negative for abdominal pain, diarrhea, nausea and vomiting.   Musculoskeletal: Positive for arthralgias.   Neurological: Negative for headaches.     Objective:     Vital Signs (Most Recent):  Temp: 98.9 °F (37.2 °C) (20 0752)  Pulse: 88 (20 0859)  Resp: 20 (20 0859)  BP: (!) 115/55 (20 0752)  SpO2: 98 % (20 0859) Vital Signs (24h Range):  Temp:  [97.7 °F (36.5 °C)-99.2 °F (37.3 °C)] 98.9 °F (37.2 °C)  Pulse:  [69-90] 88  Resp:   [18-22] 20  SpO2:  [94 %-98 %] 98 %  BP: (114-144)/(55-63) 115/55     Weight: 99.3 kg (218 lb 14.7 oz)  Body mass index is 37.58 kg/m².    Physical Exam  Vitals signs and nursing note reviewed.   Constitutional:       Appearance: She is well-developed. She is ill-appearing.   HENT:      Head: Normocephalic and atraumatic.      Right Ear: External ear normal.      Left Ear: External ear normal.      Nose: Nose normal.   Eyes:      Conjunctiva/sclera: Conjunctivae normal.      Pupils: Pupils are equal, round, and reactive to light.   Neck:      Musculoskeletal: Normal range of motion and neck supple.      Thyroid: No thyromegaly.      Vascular: No JVD.   Cardiovascular:      Rate and Rhythm: Normal rate and regular rhythm.      Heart sounds: Normal heart sounds. No murmur. No friction rub. No gallop.    Pulmonary:      Effort: Pulmonary effort is normal. No respiratory distress.      Breath sounds: Normal breath sounds. No stridor. No wheezing or rales.   Chest:      Chest wall: No tenderness.   Abdominal:      General: Bowel sounds are normal. There is no distension.      Palpations: Abdomen is soft. There is no mass.      Tenderness: There is no abdominal tenderness. There is no guarding or rebound.   Genitourinary:     Vagina: Normal. No vaginal discharge.   Musculoskeletal: Normal range of motion.         General: No deformity.   Lymphadenopathy:      Cervical: No cervical adenopathy.   Skin:     General: Skin is warm.      Capillary Refill: Capillary refill takes less than 2 seconds.      Findings: No erythema or rash.      Comments: Multiple stage 3 pressure ulcers sacaral, coccygeal, bilateral buttock   Neurological:      Mental Status: She is alert and oriented to person, place, and time.      Cranial Nerves: No cranial nerve deficit.      Sensory: No sensory deficit.      Deep Tendon Reflexes: Reflexes normal.   Psychiatric:         Behavior: Behavior normal.           CRANIAL NERVES     CN III, IV, VI    Pupils are equal, round, and reactive to light.       Significant Labs: All pertinent labs within the past 24 hours have been reviewed.    Significant Imaging: I have reviewed and interpreted all pertinent imaging results/findings within the past 24 hours.

## 2020-06-28 NOTE — PT/OT/SLP EVAL
Physical Therapy Evaluation    Patient Name:  Hilary Esqueda   MRN:  [id number]    Recommendations:     Discharge Recommendations:  nursing facility, skilled   Discharge Equipment Recommendations: (tbd)   Barriers to discharge: None    Assessment:     Hilary Esqueda is a 87 y.o. female admitted with a medical diagnosis of COVID-19.  She presents with the following impairments/functional limitations:  weakness, impaired endurance, decreased coordination, impaired self care skills, impaired functional mobilty, decreased lower extremity function, decreased safety awareness, gait instability, impaired balance, impaired cardiopulmonary response to activity.    Rehab Prognosis: Good; patient would benefit from acute skilled PT services to address these deficits and reach maximum level of function.    Recent Surgery: * No surgery found *      Plan:     During this hospitalization, patient to be seen 5 x/week to address the identified rehab impairments via gait training, therapeutic activities, therapeutic exercises and progress toward the following goals:    · Plan of Care Expires:  07/05/20    Subjective     Chief Complaint: weakness; min sob  Patient/Family Comments/goals: to get stronger; walk again and return to NH  Pain/Comfort:  · Pain Rating 1: 0/10    Patients cultural, spiritual, Protestant conflicts given the current situation:      Living Environment:  Lives at NH  Prior to admission, patients level of function was mod indep with mobility using cane or walker; assist with adl's.  Equipment used at home: walker, rolling, cane, straight, hospital bed.  DME owned (not currently used): none.  Upon discharge, patient will have assistance from nh staff.    Objective:     Communicated with RN prior to session.  Patient found HOB elevated with telemetry, oxygen, PureWick, bed alarm, peripheral IV  upon PT entry to room.    General Precautions: Standard, fall, droplet, airborne, contact, respiratory   Orthopedic  Precautions:N/A   Braces: N/A     Exams:  · B LE ROM WFL and strength grossly 3/5    Functional Mobility:  · Bed Mobility - supine to/from sit with mod A x 2 & cues for log-rolling and use of bed rail  · Transfers - sit to/from stand mod A x 2 with RW; A for balance & cues for safe hand placement - 2 reps; Lateral seated scooting mod A x 2 people; Attempted pivot t/f to chair with max A but patient requested to sit back down r/t too weak today  · Gt - wt shifting only    Therapeutic Activities and Exercises:   PT educated patient on POC, B LE ROM to prep mobility/dec stiffness and safety/fall precautions with mobility using RW.    AM-PAC 6 CLICK MOBILITY  Total Score:9     Patient left HOB elevated with all lines intact, call button in reach, bed alarm on and RN notified.    GOALS:   Multidisciplinary Problems     Physical Therapy Goals        Problem: Physical Therapy Goal    Goal Priority Disciplines Outcome Goal Variances Interventions   Physical Therapy Goal     PT, PT/OT      Description: 1. Patient will perform supine to/from sit min A  2. Patient will perform sit to/from stand with RW min A  3. Patient will ambulate 25ft RW min A                    History:     No past medical history on file.    No past surgical history on file.    Time Tracking:     PT Received On: 06/28/20  PT Start Time: 1605     PT Stop Time: 1645  PT Total Time (min): 40 min     Billable Minutes: Evaluation 15, Therapeutic Activity 15 and Therapeutic Exercise 10      John Royal, PT  06/28/2020

## 2020-06-28 NOTE — ASSESSMENT & PLAN NOTE
- COVID-19 testing: positive   - Infection Control notified     - Isolation:   - Airborne, Contact and Droplet Precautions  - Cohort patients into COVID units  - N95 mask, wear eye protection  - 20 second hand hygiene              - Limit visitors per hospital policy              - Consolidating lab draws, nursing care, provider visits, and interventions    - Diagnostics: (leukopenia, hyponatremia, hyperferritinemia, elevated troponin, elevated d-dimer, age, and comorbidities are significant predictors of poor clinical outcome)  CBC, CMP, Ferritin, CRP and Portable CXR    - Management:  Supplemental O2 to maintain SpO2 >92%  Telemetry  Continuous/intermittent Pulse Ox  Albuterol treatment PRN  Pharmacy Consult for Remdesivir Pending  Add Doxy and Ceft (PCT+)  Continue to monitor         6/27   Day 2 of 5 of Remdesivir  Continue rest of care     6/28  Due to PICC line issues yesterday was Day 1 of Remdesivir   Today is Day 2   Continue rest treatment plan

## 2020-06-28 NOTE — PT/OT/SLP EVAL
Occupational Therapy   Evaluation    Name: Hilary Esqueda  MRN: [id number]  Admitting Diagnosis:  COVID-19      Recommendations:     Discharge Recommendations: nursing facility, skilled  Discharge Equipment Recommendations:  walker, rolling  Barriers to discharge:  None    Assessment:     Hilary Esqueda is a 87 y.o. female with a medical diagnosis of COVID-19.  She presents with DEBILITY AND GENERALIZED WEAKNESS. Performance deficits affecting function: weakness, impaired functional mobilty, impaired endurance, impaired balance, impaired self care skills, gait instability.      Rehab Prognosis: Good; patient would benefit from acute skilled OT services to address these deficits and reach maximum level of function.       Plan:     Patient to be seen 3 x/week to address the above listed problems via self-care/home management, therapeutic activities, therapeutic exercises  · Plan of Care Expires:    · Plan of Care Reviewed with: patient, spouse    Subjective     Chief Complaint: DEBILITY AND GENERALIZED WEAKNESS  Patient/Family Comments/goals:     Occupational Profile:  Living Environment: LIVES IN NURSING FACILITY  Previous level of function: POOR HISTORIAN  Roles and Routines: OCCUPATIONAL TJHERAPY  Equipment Used at Home:  walker, rolling, cane, straight, hospital bed(PT HAS [employer]ANIC[employer] ACCESSABILITIES . PT RESIDE IN NURGING HOME FACILITY)  Assistance upon Discharge:     Pain/Comfort:  · Pain Rating 1: 0/10    Patients cultural, spiritual, Yarsanism conflicts given the current situation:      Objective:     Communicated with: NURSE MILLER AND [facility] CHART REVIEW prior to session.  Patient found HOB elevated with peripheral IV, PureWick, oxygen, telemetry, bed alarm upon OT entry to room.    General Precautions: Standard, fall, airborne, contact, respiratory   Orthopedic Precautions:N/A   Braces: N/A     Occupational Performance:    Bed Mobility:    · Patient completed Rolling/Turning to Right with moderate  assistance  · Patient completed Scooting/Bridging with moderate assistance  · Patient completed Supine to Sit with moderate assistance    Functional Mobility/Transfers:  · Patient completed Sit <> Stand Transfer with maximal assistance and of 2 persons  with  WITH AND WITHOUT ROLLING WALKER X 3-4 ATTEMPTS.    · Functional Mobility: UNABLE TO PERFORM TASK    Cognitive/Visual Perceptual:  Cognitive/Psychosocial Skills:     -       Oriented to: Person   Visual/Perceptual:      -Intact .    Physical Exam:  Upper Extremity Range of Motion:     -       Right Upper Extremity: WFL  -       Left Upper Extremity: WFL  Upper Extremity Strength:    -       Right Upper Extremity: MMT: 3/5 GROSSLY  -       Left Upper Extremity: MMT: 3/5 GROSSLY   Strength:    -       Right Upper Extremity: MMT: 3/5 GROSSLY  -       Left Upper Extremity: MMT: 3/5 GROSSLY    AMPAC 6 Click ADL:  AMPAC Total Score: 14    Treatment & Education:    Education:    Patient left up in chair with all lines intact, call button in reach and CNA ABBIAY notifiedCNA MILO      GOALS:   Multidisciplinary Problems     Occupational Therapy Goals        Problem: Occupational Therapy Goal    Goal Priority Disciplines Outcome Interventions   Occupational Therapy Goal     OT, PT/OT     Description: OT GOALS TO BE MET 7-5-20  PT WILL TOLERATE 1 SET X 10 REPS B UE ROM  MIN A WITH C T/F'S  MOD A WITH BED MOBILITY                   History:     No past medical history on file.    No past surgical history on file.    Time Tracking:     OT Date of Treatment: 06/28/20  OT Start Time: 1407  OT Stop Time: 1430  OT Total Time (min): 23 min    Billable Minutes:Evaluation 10 MINUTES  Therapeutic Activity 13 MINUTES    Sania Arndt OT  6/28/2020

## 2020-06-28 NOTE — PLAN OF CARE
Mod A x 2 bed mobility, sit to/from stand with RW and 1 side step; too weak to get in chair today; rec snf

## 2020-06-28 NOTE — PLAN OF CARE
AOx4. POC reviewed. Updated white board. Able to verbalize understanding, needs & follow commands. VSS. Tele-monitoring continues. PICC to JEANE. Saline locked. 3L O2 NC. Several wounds to buttocks & coccyx. Denies pain. NADN at this time. Resting quietly in bed. One-person assist. Hourly rounding complete. All safety measures remain in place. Bed alarm on & audible. Free of falls. SR up x2; bed low & locked. Call light w/in reach. Will continue to monitor throughout shift.

## 2020-06-28 NOTE — PLAN OF CARE
Patient continues to be monitored and treated. Q2H turns enforced. Dressing remains in place per wound care orders. Patient and patient family educated on the importance of physical activity. Patient's independence encouraged. PT and OT ordered. IV abx and Remdesivir to be administered. Will continue to monitor and treat.

## 2020-06-28 NOTE — PROGRESS NOTES
[facility] Hospital Medicine  Progress Note    Patient Name: Hilary Esqueda  MRN: [id number]  Patient Class: IP- Inpatient   Admission Date: 6/25/2020  Length of Stay: 3 days  Attending Physician: Mauro Contreras MD  Primary Care Provider: Afua Hector MD        Subjective:     Principal Problem:COVID-19        HPI:  Mrs. Esqueda is an 86 yo AAF with Hx of HTN, DM, GERD, OA, Mild Dementia who comes from NH for evaluation of SOB. Per patient she reports that for the past week she has not been feeling well. She endorses chills, cough, SOB, nausea. Notes that she had two negative COVID test during this time. Today she was unable to catch her breath and required oxygen. In ED patient noted to be hypoxic, with COVID+ and CXR concerning for PNA and admitted for further management.       Overview/Hospital Course:  6/26  Patient seen at bedside this morning, resting comfortably. Reports she was cold overnight. Notes he cough and SOB remain stable. Continues to report pain in her knee, discussed adding Tramadol for pain and she agreed. No other acute events in the last 24 hours.   6/27  Patient seen this morning at bedside, resting comfortably. She started Remdesivir yesterday. Also reports Tramadol is helping for pain. No acute events in the last 24 hours.   6/28   Patient seen this morning at bedside, resting comfortably. Seems as patient PICC was clotted and required Cathflo yesterday, afterwards worked fine. Due to this Remdesivir was not started until yesterday. Patient not requiring supplemental oxygen at the moment. Is feeling well, has no specific complaints.     No past medical history on file.    No past surgical history on file.    Review of patient's allergies indicates:   Allergen Reactions    Ace inhibitors Anaphylaxis    Amoxicillin Itching    Codeine Other (See Comments)     confusion    Penicillins Itching    Sulfa (sulfonamide antibiotics) Rash       No current  facility-administered medications on file prior to encounter.      Current Outpatient Medications on File Prior to Encounter   Medication Sig    amlodipine (NORVASC) 10 MG tablet     diclofenac sodium (VOLTAREN) 1 % Gel Apply 4 g topically 3 (three) times daily.    gabapentin (NEURONTIN) 100 MG capsule     glipiZIDE (GLUCOTROL) 10 MG tablet     hydrochlorothiazide (HYDRODIURIL) 25 MG tablet     metformin (GLUCOPHAGE) 500 MG tablet     naproxen (NAPROSYN) 375 MG tablet     oxybutynin (DITROPAN-XL) 5 MG TR24     ranitidine (ZANTAC) 150 MG tablet      Family History     None        Tobacco Use    Smoking status: Former Smoker     Types: Cigarettes     Quit date: 1993     Years since quittin.2    Smokeless tobacco: Never Used   Substance and Sexual Activity    Alcohol use: No    Drug use: No    Sexual activity: Never     Partners: Male     Review of Systems   Constitutional: Negative for activity change, chills and fatigue.   HENT: Negative for congestion, ear pain, rhinorrhea and sore throat.    Respiratory: Negative for cough, chest tightness and shortness of breath.    Cardiovascular: Negative for chest pain.   Gastrointestinal: Negative for abdominal pain, diarrhea, nausea and vomiting.   Musculoskeletal: Positive for arthralgias.   Neurological: Negative for headaches.     Objective:     Vital Signs (Most Recent):  Temp: 98.9 °F (37.2 °C) (20 0752)  Pulse: 88 (20 0859)  Resp: 20 (20 0859)  BP: (!) 115/55 (20 0752)  SpO2: 98 % (20 0859) Vital Signs (24h Range):  Temp:  [97.7 °F (36.5 °C)-99.2 °F (37.3 °C)] 98.9 °F (37.2 °C)  Pulse:  [69-90] 88  Resp:  [18-22] 20  SpO2:  [94 %-98 %] 98 %  BP: (114-144)/(55-63) 115/55     Weight: 99.3 kg (218 lb 14.7 oz)  Body mass index is 37.58 kg/m².    Physical Exam  Vitals signs and nursing note reviewed.   Constitutional:       Appearance: She is well-developed. She is ill-appearing.   HENT:      Head: Normocephalic and  atraumatic.      Right Ear: External ear normal.      Left Ear: External ear normal.      Nose: Nose normal.   Eyes:      Conjunctiva/sclera: Conjunctivae normal.      Pupils: Pupils are equal, round, and reactive to light.   Neck:      Musculoskeletal: Normal range of motion and neck supple.      Thyroid: No thyromegaly.      Vascular: No JVD.   Cardiovascular:      Rate and Rhythm: Normal rate and regular rhythm.      Heart sounds: Normal heart sounds. No murmur. No friction rub. No gallop.    Pulmonary:      Effort: Pulmonary effort is normal. No respiratory distress.      Breath sounds: Normal breath sounds. No stridor. No wheezing or rales.   Chest:      Chest wall: No tenderness.   Abdominal:      General: Bowel sounds are normal. There is no distension.      Palpations: Abdomen is soft. There is no mass.      Tenderness: There is no abdominal tenderness. There is no guarding or rebound.   Genitourinary:     Vagina: Normal. No vaginal discharge.   Musculoskeletal: Normal range of motion.         General: No deformity.   Lymphadenopathy:      Cervical: No cervical adenopathy.   Skin:     General: Skin is warm.      Capillary Refill: Capillary refill takes less than 2 seconds.      Findings: No erythema or rash.      Comments: Multiple stage 3 pressure ulcers sacaral, coccygeal, bilateral buttock   Neurological:      Mental Status: She is alert and oriented to person, place, and time.      Cranial Nerves: No cranial nerve deficit.      Sensory: No sensory deficit.      Deep Tendon Reflexes: Reflexes normal.   Psychiatric:         Behavior: Behavior normal.           CRANIAL NERVES     CN III, IV, VI   Pupils are equal, round, and reactive to light.       Significant Labs: All pertinent labs within the past 24 hours have been reviewed.    Significant Imaging: I have reviewed and interpreted all pertinent imaging results/findings within the past 24 hours.      Assessment/Plan:      * COVID-19  - COVID-19 testing:  positive   - Infection Control notified     - Isolation:   - Airborne, Contact and Droplet Precautions  - Cohort patients into COVID units  - N95 mask, wear eye protection  - 20 second hand hygiene              - Limit visitors per hospital policy              - Consolidating lab draws, nursing care, provider visits, and interventions    - Diagnostics: (leukopenia, hyponatremia, hyperferritinemia, elevated troponin, elevated d-dimer, age, and comorbidities are significant predictors of poor clinical outcome)  CBC, CMP, Ferritin, CRP and Portable CXR    - Management:  Supplemental O2 to maintain SpO2 >92%  Telemetry  Continuous/intermittent Pulse Ox  Albuterol treatment PRN  Pharmacy Consult for Remdesivir Pending  Add Doxy and Ceft (PCT+)  Continue to monitor        6/27   Day 2 of 5 of Remdesivir  Continue rest of care     6/28  Due to PICC line issues yesterday was Day 1 of Remdesivir   Today is Day 2   Continue rest treatment plan         SOB (shortness of breath)  2/2 COVID   Treatment as above       Acute hypoxemic respiratory failure  Monitor Respiratory Status  Supplemental Oxygen keep O2 Sat >92%        Gastroesophageal reflux disease without esophagitis  Famotidine 20mg     Essential hypertension  Monitor BP  Norvasc 10mg       Diabetes mellitus without complication  Monitor F/S  Diabetic Diet  ISS        VTE Risk Mitigation (From admission, onward)    None                Mauro Contreras MD  Department of Hospital Medicine   [facility]

## 2020-06-29 LAB
ALBUMIN SERPL BCP-MCNC: 1.6 G/DL (ref 3.5–5.2)
ALP SERPL-CCNC: 94 U/L (ref 55–135)
ALT SERPL W/O P-5'-P-CCNC: 8 U/L (ref 10–44)
ANION GAP SERPL CALC-SCNC: 13 MMOL/L (ref 8–16)
AST SERPL-CCNC: 10 U/L (ref 10–40)
BACTERIA UR CULT: ABNORMAL
BILIRUB SERPL-MCNC: 0.2 MG/DL (ref 0.1–1)
BUN SERPL-MCNC: 24 MG/DL (ref 8–23)
CALCIUM SERPL-MCNC: 7.6 MG/DL (ref 8.7–10.5)
CHLORIDE SERPL-SCNC: 110 MMOL/L (ref 95–110)
CO2 SERPL-SCNC: 20 MMOL/L (ref 23–29)
CREAT SERPL-MCNC: 0.7 MG/DL (ref 0.5–1.4)
EST. GFR  (AFRICAN AMERICAN): >60 ML/MIN/1.73 M^2
EST. GFR  (NON AFRICAN AMERICAN): >60 ML/MIN/1.73 M^2
GLUCOSE SERPL-MCNC: 131 MG/DL (ref 70–110)
POCT GLUCOSE: 148 MG/DL (ref 70–110)
POCT GLUCOSE: 180 MG/DL (ref 70–110)
POCT GLUCOSE: 236 MG/DL (ref 70–110)
POCT GLUCOSE: 240 MG/DL (ref 70–110)
POTASSIUM SERPL-SCNC: 3.2 MMOL/L (ref 3.5–5.1)
PROT SERPL-MCNC: 5.3 G/DL (ref 6–8.4)
SODIUM SERPL-SCNC: 143 MMOL/L (ref 136–145)

## 2020-06-29 PROCEDURE — 63600175 PHARM REV CODE 636 W HCPCS: Performed by: INTERNAL MEDICINE

## 2020-06-29 PROCEDURE — 96372 THER/PROPH/DIAG INJ SC/IM: CPT

## 2020-06-29 PROCEDURE — 94761 N-INVAS EAR/PLS OXIMETRY MLT: CPT

## 2020-06-29 PROCEDURE — 27000221 HC OXYGEN, UP TO 24 HOURS

## 2020-06-29 PROCEDURE — 25000003 PHARM REV CODE 250: Performed by: INTERNAL MEDICINE

## 2020-06-29 PROCEDURE — 25000003 PHARM REV CODE 250: Performed by: NURSE PRACTITIONER

## 2020-06-29 PROCEDURE — 99900035 HC TECH TIME PER 15 MIN (STAT)

## 2020-06-29 PROCEDURE — 21400001 HC TELEMETRY ROOM

## 2020-06-29 PROCEDURE — 97110 THERAPEUTIC EXERCISES: CPT | Mod: CQ

## 2020-06-29 PROCEDURE — 80053 COMPREHEN METABOLIC PANEL: CPT

## 2020-06-29 RX ORDER — HYDROXYZINE PAMOATE 25 MG/1
25 CAPSULE ORAL EVERY 8 HOURS PRN
Status: DISCONTINUED | OUTPATIENT
Start: 2020-06-30 | End: 2020-07-01 | Stop reason: HOSPADM

## 2020-06-29 RX ORDER — ONDANSETRON 4 MG/1
4 TABLET, ORALLY DISINTEGRATING ORAL EVERY 8 HOURS PRN
Status: DISCONTINUED | OUTPATIENT
Start: 2020-06-29 | End: 2020-07-01 | Stop reason: HOSPADM

## 2020-06-29 RX ADMIN — INSULIN ASPART 4 UNITS: 100 INJECTION, SOLUTION INTRAVENOUS; SUBCUTANEOUS at 10:06

## 2020-06-29 RX ADMIN — OXYBUTYNIN CHLORIDE 5 MG: 5 TABLET, EXTENDED RELEASE ORAL at 09:06

## 2020-06-29 RX ADMIN — SODIUM CHLORIDE 100 MG: 0.9 INJECTION, SOLUTION INTRAVENOUS at 04:06

## 2020-06-29 RX ADMIN — FAMOTIDINE 20 MG: 20 TABLET ORAL at 09:06

## 2020-06-29 RX ADMIN — INSULIN ASPART 2 UNITS: 100 INJECTION, SOLUTION INTRAVENOUS; SUBCUTANEOUS at 04:06

## 2020-06-29 RX ADMIN — DOXYCYCLINE HYCLATE 100 MG: 100 TABLET, COATED ORAL at 08:06

## 2020-06-29 RX ADMIN — INSULIN ASPART 2 UNITS: 100 INJECTION, SOLUTION INTRAVENOUS; SUBCUTANEOUS at 08:06

## 2020-06-29 RX ADMIN — ALUMINUM HYDROXIDE, MAGNESIUM HYDROXIDE, AND SIMETHICONE 30 ML: 200; 200; 20 SUSPENSION ORAL at 09:06

## 2020-06-29 RX ADMIN — FAMOTIDINE 20 MG: 20 TABLET ORAL at 08:06

## 2020-06-29 RX ADMIN — GABAPENTIN 100 MG: 100 CAPSULE ORAL at 08:06

## 2020-06-29 RX ADMIN — DOXYCYCLINE HYCLATE 100 MG: 100 TABLET, COATED ORAL at 09:06

## 2020-06-29 RX ADMIN — CEFTRIAXONE 1 G: 1 INJECTION, SOLUTION INTRAVENOUS at 04:06

## 2020-06-29 RX ADMIN — ONDANSETRON 4 MG: 4 TABLET, ORALLY DISINTEGRATING ORAL at 10:06

## 2020-06-29 RX ADMIN — AMLODIPINE BESYLATE 10 MG: 10 TABLET ORAL at 09:06

## 2020-06-29 NOTE — PT/OT/SLP PROGRESS
Physical Therapy  Treatment    Hilary Esqueda   MRN: [id number]   Admitting Diagnosis: COVID-19    PT Received On: 06/29/20  PT Start Time: 1830     PT Stop Time: 1900    PT Total Time (min): 30 min       Billable Minutes:  Therapeutic Activity 30    Treatment Type: Treatment  PT/PTA: PTA     PTA Visit Number: 1       General Precautions: Standard, airborne, droplet, fall, respiratory  Orthopedic Precautions: N/A   Braces: N/A         Subjective:  Communicated with epic and nurse: thomas,  prior to session.  Pt agreed to tx with encouragement. Increased complaints of weakness, some complaints of nausea and not being able to eat and dizziness.     Pain/Comfort  Pain Rating 1: 4/10  Location - Side 1: Left  Location - Orientation 1: lateral  Location 1: abdomen  Pain Addressed 1: Reposition, Distraction  Pain Rating Post-Intervention 1: 5/10    Objective:   Patient found with: PureWick, bed alarm, telemetry(no oxygen, weaning per NURSE)    Functional Mobility:  Bed Mobility:     MAX 1 -2 people for supine to sit, sit to supine,     Transfers:    Unable due to weakness     Gait:     Unable due to weakness              Therapeutic Activities and Exercises:  Pt sat eob ~10 min with poor static sitting balance. By kyphotic and rounded fwd. Difficulty holding her head up , would switch from leaning on her L elbow to her right elbow, suddenly and with out warning. Frequent complaints of being weak, dizzy and or nauseated. Performed 10 reps of TKE aarom in sitting. MAX a for seated scooting to the eob while sitting. Unable to laterally scoot to the hob. MAX a for sit to supine, Dependently scooted to the HOB in supine, heels floated.      AM-PAC 6 CLICK MOBILITY  How much help from another person does this patient currently need?   1 = Unable, Total/Dependent Assistance  2 = A lot, Maximum/Moderate Assistance  3 = A little, Minimum/Contact Guard/Supervision  4 = None, Modified [place]/Independent    Turning over in bed  (including adjusting bedclothes, sheets and blankets)?: 2  Sitting down on and standing up from a chair with arms (e.g., wheelchair, bedside commode, etc.): 2  Moving from lying on back to sitting on the side of the bed?: 2  Moving to and from a bed to a chair (including a wheelchair)?: 1  Need to walk in hospital room?: 1  Climbing 3-5 steps with a railing?: 1  Basic Mobility Total Score: 9    AM-PAC Raw Score CMS G-Code Modifier Level of Impairment Assistance   6 % Total / Unable   7 - 9 CM 80 - 100% Maximal Assist   10 - 14 CL 60 - 80% Moderate Assist   15 - 19 CK 40 - 60% Moderate Assist   20 - 22 CJ 20 - 40% Minimal Assist   23 CI 1-20% SBA / CGA   24 CH 0% Independent/ Mod I     Patient left HOB elevated with all lines intact, call button in reach and bed alarm on.    Assessment:  Hilary Esqueda is a 87 y.o. female with a medical diagnosis of COVID-19 and presents with cooperation and motivation but very weak. Poor communicator so it was difficult to assess her dizziness etc.     Rehab identified problem list/impairments: Rehab identified problem list/impairments: weakness, impaired self care skills, impaired balance, decreased coordination, decreased safety awareness, decreased ROM, impaired joint extensibility, impaired muscle length, impaired coordination, pain, decreased upper extremity function, impaired functional mobilty, impaired endurance, gait instability, impaired cognition, decreased lower extremity function, impaired cardiopulmonary response to activity    Rehab potential is fair.    Activity tolerance: Fair    Discharge recommendations: Discharge Facility/Level of Care Needs: nursing facility, skilled     Barriers to discharge:      Equipment recommendations: Equipment Needed After Discharge: walker, rolling     GOALS:   Multidisciplinary Problems     Physical Therapy Goals        Problem: Physical Therapy Goal    Goal Priority Disciplines Outcome Goal Variances Interventions   Physical  Therapy Goal     PT, PT/OT Ongoing, Progressing     Description: 1. Patient will perform supine to/from sit min A  2. Patient will perform sit to/from stand with RW min A  3. Patient will ambulate 25ft RW min A                    PLAN:    Patient to be seen 5 x/week  to address the above listed problems via gait training, therapeutic activities, therapeutic exercises  Plan of Care expires: 07/05/20  Plan of Care reviewed with: patient         Abimauricio Rioserson, PTA  06/29/2020

## 2020-06-29 NOTE — PLAN OF CARE
Pt c/o of covid +, PNA.   Interventions performed: cardiac monitoring continued, labs monitored, activity increased as tolerated, skin monitored for breakdown, attempting to wean O2, WC following, PT/OT following, prn meds provided.  Mobility status: moderate assist, x1-2.   Patient AFIB on monitor.   Safety maintained per pt.  Pt aware of POC.  Will continue to monitor.

## 2020-06-29 NOTE — PROGRESS NOTES
06/29/20 1045   Skin   Skin WDL ex        Altered Skin Integrity 06/26/20 0833 midline Coccyx #1 Full thickness tissue loss. Subcutaneous fat may be visible but bone, tendon or muscle are not exposed   Date First Assessed/Time First Assessed: 06/26/20 0833   Altered Skin Integrity Present on Admission: (c) yes  Orientation: midline  Location: Coccyx  Wound Number (optional): #1  Is this injury device related?: No  Description of Altered Skin Integri...   Description of Altered Skin Integrity Full thickness tissue loss. Subcutaneous fat may be visible but bone, tendon or muscle are not exposed   Dressing Appearance Intact   Drainage Amount Scant   Drainage Characteristics/Odor Serous   Appearance Red;Yellow;Adipose;Moist   Tissue loss description Full thickness   Care Cleansed with:;Sterile normal saline;Applied:;Skin Barrier   Dressing Hydrofiber;Silver;Hydrocolloid   Dressing Change Due 07/04/20        Altered Skin Integrity 06/26/20 0833 Left medial Buttocks #2 Full thickness tissue loss. Subcutaneous fat may be visible but bone, tendon or muscle are not exposed   Date First Assessed/Time First Assessed: 06/26/20 0833   Altered Skin Integrity Present on Admission: yes  Side: Left  Orientation: medial  Location: Buttocks  Wound Number (optional): #2  Is this injury device related?: No  Description of Altered Ski...   Description of Altered Skin Integrity Full thickness tissue loss. Subcutaneous fat may be visible but bone, tendon or muscle are not exposed   Dressing Appearance Intact   Drainage Amount Small   Drainage Characteristics/Odor Serous   Appearance Red;Yellow;Adipose;Moist   Tissue loss description Full thickness   Care Cleansed with:;Sterile normal saline;Applied:;Skin Barrier   Dressing Hydrofiber;Silver;Hydrocolloid;Applied   Dressing Change Due 07/04/20        Altered Skin Integrity 06/26/20 0833 Left lower Buttocks #3 Full thickness tissue loss. Subcutaneous fat may be visible but bone, tendon or  muscle are not exposed   Date First Assessed/Time First Assessed: 06/26/20 0833   Altered Skin Integrity Present on Admission: yes  Side: Left  Orientation: lower  Location: Buttocks  Wound Number (optional): #3  Is this injury device related?: No  Description of Altered Skin...   Description of Altered Skin Integrity Full thickness tissue loss. Subcutaneous fat may be visible but bone, tendon or muscle are not exposed   Dressing Appearance Intact   Drainage Amount Scant   Drainage Characteristics/Odor Serous   Appearance Red;Yellow;Moist   Tissue loss description Full thickness   Care Cleansed with:;Sterile normal saline;Applied:;Skin Barrier   Dressing Hydrofiber;Silver;Hydrocolloid   Dressing Change Due 07/04/20        Altered Skin Integrity 06/26/20 0833 Left lower Buttocks #4 Full thickness tissue loss. Subcutaneous fat may be visible but bone, tendon or muscle are not exposed   Date First Assessed/Time First Assessed: 06/26/20 0833   Altered Skin Integrity Present on Admission: yes  Side: Left  Orientation: lower  Location: Buttocks  Wound Number (optional): #4  Is this injury device related?: Yes  Description of Altered Ski...   Description of Altered Skin Integrity Full thickness tissue loss. Subcutaneous fat may be visible but bone, tendon or muscle are not exposed   Dressing Appearance Intact   Drainage Amount Scant   Drainage Characteristics/Odor Serous   Appearance Red;Yellow;Adipose;Moist   Tissue loss description Full thickness   Care Cleansed with:;Sterile normal saline;Applied:;Skin Barrier   Dressing Hydrofiber;Silver;Hydrocolloid;Applied   Dressing Change Due 07/04/20        Altered Skin Integrity 06/26/20 0833 Left medial Buttocks #5 Full thickness tissue loss. Subcutaneous fat may be visible but bone, tendon or muscle are not exposed   Date First Assessed/Time First Assessed: 06/26/20 0833   Altered Skin Integrity Present on Admission: yes  Side: Left  Orientation: medial  Location: Buttocks   Wound Number (optional): #5  Is this injury device related?: Yes  Description of Altered Sk...   Description of Altered Skin Integrity Full thickness tissue loss. Subcutaneous fat may be visible but bone, tendon or muscle are not exposed   Dressing Appearance Intact   Drainage Amount Small   Drainage Characteristics/Odor Serous   Appearance Red;Yellow;Moist;Adipose   Tissue loss description Full thickness   Care Cleansed with:;Sterile normal saline;Applied:;Skin Barrier   Dressing Hydrofiber;Silver;Hydrocolloid;Applied   Dressing Change Due 07/04/20        Altered Skin Integrity 06/26/20 0833 Right Coccyx #6 Full thickness tissue loss. Subcutaneous fat may be visible but bone, tendon or muscle are not exposed   Date First Assessed/Time First Assessed: 06/26/20 0833   Altered Skin Integrity Present on Admission: yes  Side: Right  Location: Coccyx  Wound Number (optional): #6  Is this injury device related?: No  Description of Altered Skin Integrity: Full thic...   Description of Altered Skin Integrity Full thickness tissue loss. Subcutaneous fat may be visible but bone, tendon or muscle are not exposed   Dressing Appearance Intact   Drainage Amount Small   Drainage Characteristics/Odor Serous   Appearance Red;Yellow;Moist;Adipose   Tissue loss description Full thickness   Care Cleansed with:;Sterile normal saline;Applied:;Skin Barrier   Dressing Hydrofiber;Silver;Hydrocolloid;Applied   Dressing Change Due 07/04/20        Altered Skin Integrity 06/26/20 0833 Right medial Buttocks #7 Full thickness tissue loss. Subcutaneous fat may be visible but bone, tendon or muscle are not exposed   Date First Assessed/Time First Assessed: 06/26/20 0833   Altered Skin Integrity Present on Admission: yes  Side: Right  Orientation: medial  Location: Buttocks  Wound Number (optional): #7  Is this injury device related?: No  Description of Altered Sk...   Description of Altered Skin Integrity Full thickness tissue loss. Subcutaneous  fat may be visible but bone, tendon or muscle are not exposed   Dressing Appearance Intact   Drainage Amount Small   Drainage Characteristics/Odor Serous   Appearance Red;Yellow;Adipose;Moist   Tissue loss description Full thickness   Care Cleansed with:;Sterile normal saline;Applied:;Skin Barrier   Dressing Hydrofiber;Silver;Hydrocolloid;Applied   Dressing Change Due 07/04/20     Follow up visit with Ms. Esqueda this morning for continued wound care. She has 7 present on admission stage 3 pressure injuries to sacral/coccygeal/bilateral buttock region and was treated over the weekend with moisture barrier paste and foam dressings. This morning, dressing removed and cleansed gently with saline. imrpovement in appearance of wounds is noted this morning. Painted tommie wound skin with cavilon. aquacel ag extra cut to size and applied to cover open wounds, and all secured with 2 duoderms applied, overlapping in center. Recommend dressing change every 5 days and prn. Recommend continued turning q2h, continue waffle overlay. Will continue to follow.

## 2020-06-29 NOTE — PLAN OF CARE
Patient AAOX 4 VSS.  Patient remained afebrile throughout shift.  Patient remained free of falls this shift  Patient 0/10 of pain this shift  blood glucose monitored, corrected via SSI.  Plan of care reviewed.  Patient verbalized understanding.  Patient moving/turning independently  Frequent weight shifting encouraged.  Patient Afib on monitor  Bed low, side rails up x2, wheels locked, call light in reach.  Bed alarm maintained for safety.  Patient instructed to call for assistance.  Hourly rounding completed.  Will continue to monitor.

## 2020-06-29 NOTE — PLAN OF CARE
Pt required MAX A for all bed mobility. Sat eob ~10 min with increased dizziness. Poor trunk control.

## 2020-06-29 NOTE — PHYSICIAN QUERY
PT Name: Hilary Esqueda  MR #: [id number]     Diagnosis Clarification      CDS/: CORIE Salas, RN, CDS               Contact information:[email or web address]     This form is a permanent document in the medical record.     Query Date: June 29, 2020    Dear Provider,  By submitting this query, we are merely seeking further clarification of documentation.  Please utilize your independent clinical judgment when addressing the question(s) below.     The medical record contains the following:    Supporting Clinical Information Location in Medical Record   Patient on 5L [place] oxygen, prior to COVID, she was not needing any oxygen    In ED patient noted to be hypoxic, with COVID+ and CXR concerning for PNA and admitted for further management.  COVID-19     Positive for cough, chest tightness and shortness of breath    Vital Signs (24h Range):  Temp:  [98.6 °F (37 °C)-98.9 °F (37.2 °C)] 98.9 °F (37.2 °C)  Pulse:  [78-91] 78  Resp:  [16-34] 18  SpO2:  [89 %-100 %] 100 %    Acute hypoxemic respiratory failure  SOB 2/2 COVID    Supplemental O2 to maintain SpO2 >92%    Add Doxy and Ceft (PCT+)  Day 2 of 5 of Remdesivir    Vital Signs (24h Range):  Temp:  [98 °F (36.7 °C)-98.3 °F (36.8 °C)] 98 °F (36.7 °C)  Pulse:  [66-92] 74  Resp:  [18-20] 18  SpO2:  [94 %-99 %] 98 %    CXR impression: Scattered ground-glass pulmonary infiltrates greatest within the lower lobe on the left.Findings are not entirely specific but can be seen with viral pneumonitis/COVID-19.    CXR impression: There has been interval worsening of the appearance of the lungs. There is a moderate amount of coarse interstitial opacities scattered throughout the inferior 2/3 of both lungs. The borders of the heart are not well seen.  This is characteristic of pneumonia.  The costophrenic angles were not well seen.      Ceftriaxone 1 g IV every 24 hours    Doxycycline 100mg oral every 12 hours    Remdesivir 200 mg IV every 24 hours      6/25/2020 12:12   POC  PH 7.428   POC PCO2 27.9 (LL)   POC PO2 65 (L)   POC BE -6   POC HCO3 18.5 (L)   POC SATURATED O2 93 (L)   FiO2 36   Flow 4   Sample ARTERIAL   DelSys Nasal Can        6/25 6/26   WBC 8.51 4.96    ED, DR. Queen, 6/25      H&P, Dr. Contreras, 6/25                                  , Dr. Contreras, 6/27                  CXR 6/25        CXR 6/26                MAR 6/26    MAR 6/26    MAR 6/27    Lab 6/25                                  Lab 6/25- 6/26     Please clarify if the Pneumonia diagnosis has been:    [x  ] Ruled In   [  ] Ruled Out   [  ] Other/Clarification of findings (please specify)_______________    [   ] Clinically undetermined       Present on admission (POA) status:   [   ] Yes (Y)                          [  ] Clinically Undetermined (W)  [   ] No (N)                            [   ] Documentation insufficient to determine if condition is POA (U)       Please document in your progress notes daily for the duration of treatment, until resolved, and include in your discharge summary.

## 2020-06-30 LAB
ALBUMIN SERPL BCP-MCNC: 1.6 G/DL (ref 3.5–5.2)
ALP SERPL-CCNC: 97 U/L (ref 55–135)
ALT SERPL W/O P-5'-P-CCNC: 16 U/L (ref 10–44)
ANION GAP SERPL CALC-SCNC: 9 MMOL/L (ref 8–16)
AST SERPL-CCNC: 22 U/L (ref 10–40)
BACTERIA BLD CULT: NORMAL
BILIRUB SERPL-MCNC: 0.1 MG/DL (ref 0.1–1)
BUN SERPL-MCNC: 24 MG/DL (ref 8–23)
CALCIUM SERPL-MCNC: 7.8 MG/DL (ref 8.7–10.5)
CHLORIDE SERPL-SCNC: 113 MMOL/L (ref 95–110)
CO2 SERPL-SCNC: 22 MMOL/L (ref 23–29)
CREAT SERPL-MCNC: 0.7 MG/DL (ref 0.5–1.4)
EST. GFR  (AFRICAN AMERICAN): >60 ML/MIN/1.73 M^2
EST. GFR  (NON AFRICAN AMERICAN): >60 ML/MIN/1.73 M^2
GLUCOSE SERPL-MCNC: 146 MG/DL (ref 70–110)
POCT GLUCOSE: 158 MG/DL (ref 70–110)
POCT GLUCOSE: 178 MG/DL (ref 70–110)
POCT GLUCOSE: 186 MG/DL (ref 70–110)
POCT GLUCOSE: 195 MG/DL (ref 70–110)
POTASSIUM SERPL-SCNC: 3.3 MMOL/L (ref 3.5–5.1)
PROT SERPL-MCNC: 5.2 G/DL (ref 6–8.4)
SODIUM SERPL-SCNC: 144 MMOL/L (ref 136–145)

## 2020-06-30 PROCEDURE — 25000003 PHARM REV CODE 250: Performed by: INTERNAL MEDICINE

## 2020-06-30 PROCEDURE — 94761 N-INVAS EAR/PLS OXIMETRY MLT: CPT

## 2020-06-30 PROCEDURE — 21400001 HC TELEMETRY ROOM

## 2020-06-30 PROCEDURE — 80053 COMPREHEN METABOLIC PANEL: CPT

## 2020-06-30 PROCEDURE — 25000003 PHARM REV CODE 250: Performed by: NURSE PRACTITIONER

## 2020-06-30 PROCEDURE — 96372 THER/PROPH/DIAG INJ SC/IM: CPT

## 2020-06-30 PROCEDURE — 97530 THERAPEUTIC ACTIVITIES: CPT

## 2020-06-30 PROCEDURE — 97110 THERAPEUTIC EXERCISES: CPT | Performed by: PHYSICAL THERAPIST

## 2020-06-30 RX ADMIN — AMLODIPINE BESYLATE 10 MG: 10 TABLET ORAL at 08:06

## 2020-06-30 RX ADMIN — INSULIN ASPART 2 UNITS: 100 INJECTION, SOLUTION INTRAVENOUS; SUBCUTANEOUS at 10:06

## 2020-06-30 RX ADMIN — INSULIN ASPART 2 UNITS: 100 INJECTION, SOLUTION INTRAVENOUS; SUBCUTANEOUS at 04:06

## 2020-06-30 RX ADMIN — DOXYCYCLINE HYCLATE 100 MG: 100 TABLET, COATED ORAL at 09:06

## 2020-06-30 RX ADMIN — SODIUM CHLORIDE 100 MG: 0.9 INJECTION, SOLUTION INTRAVENOUS at 05:06

## 2020-06-30 RX ADMIN — ONDANSETRON 4 MG: 4 TABLET, ORALLY DISINTEGRATING ORAL at 11:06

## 2020-06-30 RX ADMIN — HYDROXYZINE PAMOATE 25 MG: 25 CAPSULE ORAL at 12:06

## 2020-06-30 RX ADMIN — GABAPENTIN 100 MG: 100 CAPSULE ORAL at 09:06

## 2020-06-30 RX ADMIN — FAMOTIDINE 20 MG: 20 TABLET ORAL at 08:06

## 2020-06-30 RX ADMIN — INSULIN ASPART 2 UNITS: 100 INJECTION, SOLUTION INTRAVENOUS; SUBCUTANEOUS at 05:06

## 2020-06-30 RX ADMIN — FAMOTIDINE 20 MG: 20 TABLET ORAL at 09:06

## 2020-06-30 RX ADMIN — DOXYCYCLINE HYCLATE 100 MG: 100 TABLET, COATED ORAL at 08:06

## 2020-06-30 RX ADMIN — OXYBUTYNIN CHLORIDE 5 MG: 5 TABLET, EXTENDED RELEASE ORAL at 08:06

## 2020-06-30 RX ADMIN — INSULIN ASPART 1 UNITS: 100 INJECTION, SOLUTION INTRAVENOUS; SUBCUTANEOUS at 09:06

## 2020-06-30 RX ADMIN — HYDROXYZINE PAMOATE 25 MG: 25 CAPSULE ORAL at 10:06

## 2020-06-30 NOTE — SUBJECTIVE & OBJECTIVE
Interval History: Day 2/4 of Remdesivir Plan for SNF placement Await CM No events    Review of Systems   Constitutional: Positive for fatigue. Negative for activity change and chills.   HENT: Negative for congestion, ear pain, rhinorrhea and sore throat.    Respiratory: Negative for cough, chest tightness and shortness of breath.    Cardiovascular: Negative for chest pain.   Gastrointestinal: Negative for abdominal pain, diarrhea, nausea and vomiting.   Musculoskeletal: Positive for arthralgias.   Neurological: Positive for weakness. Negative for headaches.     Objective:     Vital Signs (Most Recent):  Temp: 98 °F (36.7 °C) (06/29/20 1608)  Pulse: 89 (06/29/20 1700)  Resp: 18 (06/29/20 1608)  BP: 115/72 (06/29/20 1608)  SpO2: 95 % (06/29/20 1608) Vital Signs (24h Range):  Temp:  [98 °F (36.7 °C)-98.6 °F (37 °C)] 98 °F (36.7 °C)  Pulse:  [61-91] 89  Resp:  [18-19] 18  SpO2:  [92 %-99 %] 95 %  BP: (111-136)/(56-72) 115/72     Weight: 101.2 kg (223 lb 1.7 oz)  Body mass index is 38.3 kg/m².    Intake/Output Summary (Last 24 hours) at 6/29/2020 1907  Last data filed at 6/29/2020 1616  Gross per 24 hour   Intake 1140 ml   Output 300 ml   Net 840 ml      Physical Exam  Vitals signs and nursing note reviewed.   Constitutional:       Appearance: She is well-developed. She is ill-appearing.   HENT:      Head: Normocephalic and atraumatic.      Right Ear: External ear normal.      Left Ear: External ear normal.      Nose: Nose normal.   Eyes:      Conjunctiva/sclera: Conjunctivae normal.      Pupils: Pupils are equal, round, and reactive to light.   Neck:      Musculoskeletal: Normal range of motion and neck supple.      Thyroid: No thyromegaly.      Vascular: No JVD.   Cardiovascular:      Rate and Rhythm: Normal rate and regular rhythm.      Heart sounds: Normal heart sounds. No murmur. No friction rub. No gallop.    Pulmonary:      Effort: Pulmonary effort is normal. No respiratory distress.      Breath sounds: Normal  breath sounds. No stridor. No wheezing or rales.   Chest:      Chest wall: No tenderness.   Abdominal:      General: Bowel sounds are normal. There is no distension.      Palpations: Abdomen is soft. There is no mass.      Tenderness: There is no abdominal tenderness. There is no guarding or rebound.   Genitourinary:     Vagina: Normal. No vaginal discharge.   Musculoskeletal: Normal range of motion.         General: No deformity.   Lymphadenopathy:      Cervical: No cervical adenopathy.   Skin:     General: Skin is warm.      Capillary Refill: Capillary refill takes less than 2 seconds.      Findings: No erythema or rash.      Comments: Multiple stage 3 pressure ulcers sacaral, coccygeal, bilateral buttock   Neurological:      Mental Status: She is alert and oriented to person, place, and time.      Cranial Nerves: No cranial nerve deficit.      Sensory: No sensory deficit.      Deep Tendon Reflexes: Reflexes normal.   Psychiatric:         Behavior: Behavior normal.         Significant Labs:   Blood Culture: No results for input(s): LABBLOO in the last 48 hours.  BMP:   Recent Labs   Lab 06/29/20  0416   *      K 3.2*      CO2 20*   BUN 24*   CREATININE 0.7   CALCIUM 7.6*     CBC: No results for input(s): WBC, HGB, HCT, PLT in the last 48 hours.  CMP:   Recent Labs   Lab 06/28/20  0523 06/29/20  0416    143   K 3.4* 3.2*   * 110   CO2 23 20*   * 131*   BUN 20 24*   CREATININE 0.7 0.7   CALCIUM 7.8* 7.6*   PROT 5.6* 5.3*   ALBUMIN 1.7* 1.6*   BILITOT 0.2 0.2   ALKPHOS 98 94   AST 10 10   ALT 10 8*   ANIONGAP 5* 13   EGFRNONAA >60 >60       All pertinent labs within the past 24 hours have been reviewed.    Significant Imaging: I have reviewed all pertinent imaging results/findings within the past 24 hours.

## 2020-06-30 NOTE — PLAN OF CARE
Pt c/o of covid +, PNA.   Interventions performed: cardiac monitoring continued, labs monitored, activity increased as tolerated, skin monitored for breakdown, attempting to wean O2, WC following, PT/OT following, prn meds provided.  Mobility status: moderate assist, x1-2.   Patient AFIB on monitor.   Safety maintained per pt.  Pt aware of POC.  Will continue to monitor

## 2020-06-30 NOTE — SUBJECTIVE & OBJECTIVE
Interval History: Improving. Plan for d/c in progress.    Review of Systems   Constitutional: Positive for activity change, appetite change and fatigue. Negative for unexpected weight change.   HENT: Negative.  Negative for trouble swallowing.    Eyes: Negative.    Respiratory: Negative.  Negative for cough, shortness of breath and wheezing.    Cardiovascular: Negative.  Negative for chest pain.   Gastrointestinal: Negative.    Endocrine: Negative.    Genitourinary: Negative.    Musculoskeletal: Negative.    Skin: Negative.    Allergic/Immunologic: Negative.    Neurological: Positive for weakness. Negative for dizziness.   Hematological: Negative.    Psychiatric/Behavioral: Negative.    All other systems reviewed and are negative.    Objective:     Vital Signs (Most Recent):  Temp: 98.5 °F (36.9 °C) (06/30/20 1559)  Pulse: 93 (06/30/20 1700)  Resp: 18 (06/30/20 1559)  BP: (!) 115/51 (06/30/20 1559)  SpO2: 97 % (06/30/20 1559) Vital Signs (24h Range):  Temp:  [98.4 °F (36.9 °C)-99.3 °F (37.4 °C)] 98.5 °F (36.9 °C)  Pulse:  [] 93  Resp:  [16-20] 18  SpO2:  [92 %-97 %] 97 %  BP: ()/(51-78) 115/51     Weight: 100 kg (220 lb 7.4 oz)  Body mass index is 37.84 kg/m².    Intake/Output Summary (Last 24 hours) at 6/30/2020 1805  Last data filed at 6/30/2020 1705  Gross per 24 hour   Intake 830 ml   Output 500 ml   Net 330 ml      Physical Exam  Vitals signs and nursing note reviewed.   Constitutional:       Appearance: She is well-developed. She is ill-appearing.   HENT:      Head: Normocephalic and atraumatic.      Right Ear: External ear normal.      Left Ear: External ear normal.      Nose: Nose normal.   Eyes:      Conjunctiva/sclera: Conjunctivae normal.      Pupils: Pupils are equal, round, and reactive to light.   Neck:      Musculoskeletal: Normal range of motion and neck supple.      Thyroid: No thyromegaly.      Vascular: No JVD.   Cardiovascular:      Rate and Rhythm: Normal rate and regular rhythm.       Heart sounds: Normal heart sounds. No murmur. No friction rub. No gallop.    Pulmonary:      Effort: Pulmonary effort is normal. No respiratory distress.      Breath sounds: Normal breath sounds. No stridor. No wheezing or rales.   Chest:      Chest wall: No tenderness.   Abdominal:      General: Bowel sounds are normal. There is no distension.      Palpations: Abdomen is soft. There is no mass.      Tenderness: There is no abdominal tenderness. There is no guarding or rebound.   Genitourinary:     Vagina: Normal. No vaginal discharge.   Musculoskeletal: Normal range of motion.         General: No deformity.   Lymphadenopathy:      Cervical: No cervical adenopathy.   Skin:     General: Skin is warm.      Capillary Refill: Capillary refill takes less than 2 seconds.      Findings: No erythema or rash.      Comments: Multiple stage 3 pressure ulcers sacaral, coccygeal, bilateral buttock   Neurological:      Mental Status: She is alert and oriented to person, place, and time.      Cranial Nerves: No cranial nerve deficit.      Sensory: No sensory deficit.      Deep Tendon Reflexes: Reflexes normal.   Psychiatric:         Behavior: Behavior normal.         Significant Labs:   BMP:   Recent Labs   Lab 06/30/20  0236   *      K 3.3*   *   CO2 22*   BUN 24*   CREATININE 0.7   CALCIUM 7.8*     CBC: No results for input(s): WBC, HGB, HCT, PLT in the last 48 hours.  CMP:   Recent Labs   Lab 06/29/20  0416 06/30/20  0236    144   K 3.2* 3.3*    113*   CO2 20* 22*   * 146*   BUN 24* 24*   CREATININE 0.7 0.7   CALCIUM 7.6* 7.8*   PROT 5.3* 5.2*   ALBUMIN 1.6* 1.6*   BILITOT 0.2 0.1   ALKPHOS 94 97   AST 10 22   ALT 8* 16   ANIONGAP 13 9   EGFRNONAA >60 >60     All pertinent labs within the past 24 hours have been reviewed.    Significant Imaging: I have reviewed all pertinent imaging results/findings within the past 24 hours.

## 2020-06-30 NOTE — NURSING
Pt had not voided entire shift. Bladder scanned pt- pt seemed to only have 300-400 mL. I&O cath-ed patient and got back 200 mL. MD notified.

## 2020-06-30 NOTE — ASSESSMENT & PLAN NOTE
- COVID-19 testing: positive   - Infection Control notified     - Isolation:   - Airborne, Contact and Droplet Precautions  - Cohort patients into COVID units  - N95 mask, wear eye protection  - 20 second hand hygiene              - Limit visitors per hospital policy              - Consolidating lab draws, nursing care, provider visits, and interventions    - Diagnostics: (leukopenia, hyponatremia, hyperferritinemia, elevated troponin, elevated d-dimer, age, and comorbidities are significant predictors of poor clinical outcome)  CBC, CMP, Ferritin, CRP and Portable CXR    - Management:  Supplemental O2 to maintain SpO2 >92%  Telemetry  Continuous/intermittent Pulse Ox  Albuterol treatment PRN  Pharmacy Consult for Remdesivir Pending  Add Doxy and Ceft (PCT+)  Continue to monitor         6/27   Day 2 of 5 of Remdesivir  Continue rest of care     6/28  Due to PICC line issues yesterday was Day 1 of Remdesivir   Today is Day 2   Continue rest treatment plan     6/29  Day 2/4 Remdesivir  SNF placement    6/30  Day 3/4 Remdesivir  Home with HH  ID

## 2020-06-30 NOTE — PLAN OF CARE
Hospital bed ordered.  Per Padmini Thomas with [facility] will require authorization from [email or web address].  Currently pending insurance authorization.       06/30/20 0383   Post-Acute Status   Post-Acute Authorization Home Health;HME   Home Health Status Pending Payor Medical Review

## 2020-06-30 NOTE — CONSULTS
Patient has chosen to go home with home health.  Spoke with Anastasia Juarezmings, granddaughter. She states family is willing for her to come home.  She request a hospital bed and oxygen.  Secure chat to Dr. Norman for home health orders and 02 evaluation if appropriate.

## 2020-06-30 NOTE — PT/OT/SLP PROGRESS
Physical Therapy  Treatment    Hilary Esqueda   MRN: [id number]   Admitting Diagnosis: COVID-19    PT Received On: 06/30/20  PT Start Time: 1105     PT Stop Time: 1115    PT Total Time (min): 10 min       Billable Minutes:  Therapeutic Activity 10    Treatment Type: Treatment  PT/PTA: PT     PTA Visit Number: 0       General Precautions: Standard, airborne, droplet, fall, contact, respiratory  Orthopedic Precautions: N/A   Braces: N/A    Subjective:  Communicated with NURSE SCHNEIDER prior to session.  Pain/Comfort  Pain Rating 1: 8/10  Location - Side 1: Right  Location 1: foot    Objective:   Patient found with: peripheral IV, telemetry    Functional Mobility:  Therapeutic Activities and Exercises:  PT FOUND SUPINE IN BED UPON ARRIVAL, PT DECLINED SITTING EOB DUE TO C/O FATIGUE AND R FOOT PAIN, PT WAS OPEN TO EDUCATION IN BLE THEREX WITH ENCOURAGEMENT, PT EDUCATED IN BLE THEREX TO PERFORM WHILE SUPINE IN BED IN ALL AVAILABLE PLANES OF MOVEMENT, PT ENCOURAGED TO PERFORM THROUGHOUT THE DAY TO TOLERANCE    AM-PAC 6 CLICK MOBILITY  How much help from another person does this patient currently need?   1 = Unable, Total/Dependent Assistance  2 = A lot, Maximum/Moderate Assistance  3 = A little, Minimum/Contact Guard/Supervision  4 = None, Modified [place]/Independent    Turning over in bed (including adjusting bedclothes, sheets and blankets)?: 2  Sitting down on and standing up from a chair with arms (e.g., wheelchair, bedside commode, etc.): 1  Moving from lying on back to sitting on the side of the bed?: 2  Moving to and from a bed to a chair (including a wheelchair)?: 1  Need to walk in hospital room?: 1  Climbing 3-5 steps with a railing?: 1  Basic Mobility Total Score: 8    AM-PAC Raw Score CMS G-Code Modifier Level of Impairment Assistance   6 % Total / Unable   7 - 9 CM 80 - 100% Maximal Assist   10 - 14 CL 60 - 80% Moderate Assist   15 - 19 CK 40 - 60% Moderate Assist   20 - 22 CJ 20 - 40% Minimal Assist    23 CI 1-20% SBA / CGA   24 CH 0% Independent/ Mod I     Patient left HOB elevated with all lines intact, call button in reach, bed alarm on and NURSE notified.    Assessment:  Hilary Esqueda is a 87 y.o. female with a medical diagnosis of COVID-19 and presents with IMPAIRED FUNCTIONAL MOBILITY. PT WILL BENEFIT FROM CONT. SKILLED P.T. TO ADDRESS IMPAIRMENTS    Rehab identified problem list/impairments: Rehab identified problem list/impairments: weakness, impaired endurance, impaired balance, impaired functional mobilty    Rehab potential is fair.    Activity tolerance: Poor    Discharge recommendations: Discharge Facility/Level of Care Needs: nursing facility, skilled     Barriers to discharge:      Equipment recommendations: Equipment Needed After Discharge: walker, rolling     GOALS:   Multidisciplinary Problems     Physical Therapy Goals        Problem: Physical Therapy Goal    Goal Priority Disciplines Outcome Goal Variances Interventions   Physical Therapy Goal     PT, PT/OT Ongoing, Progressing     Description: 1. Patient will perform supine to/from sit min A  2. Patient will perform sit to/from stand with RW min A  3. Patient will ambulate 25ft RW min A                    PLAN:    Patient to be seen 5 x/week  to address the above listed problems via therapeutic activities, therapeutic exercises  Plan of Care expires: 07/05/20  Plan of Care reviewed with: patient    Nusrat Nima, PT  06/30/2020

## 2020-06-30 NOTE — PT/OT/SLP PROGRESS
"Occupational Therapy  Treatment    Hilary Esqueda   MRN: [id number]   Admitting Diagnosis: COVID-19    OT Date of Treatment: 06/30/20   OT Start Time: 1110  OT Stop Time: 1125  OT Total Time (min): 15 min    Billable Minutes:  Therapeutic Exercise 15 min    General Precautions: Standard, respiratory, contact, fall, droplet, airborne  Orthopedic Precautions: N/A  Braces: N/A         Subjective:  Communicated with Nurse Queen and epic chart review prior to session.  Pt found supine in bed and agreeable to tx in bed.   Pain/Comfort  Pain Rating 1: 8/10  Location - Side 1: Right  Location - Orientation 1: lateral  Location 1: foot  Pain Addressed 1: Reposition, Distraction    Objective:  Patient found with: telemetry, peripheral IV     Functional Mobility:  Therapeutic Activities and Exercises:  Pt educated in and performed (B) UE ROM therapeutic exercises while supine in bed, 1 x 5-10 reps: finger flex/ ext, forearm sup/ pronation, elbow flex/ ext, shoulder flex.     AM-PAC 6 CLICK ADL   How much help from another person does this patient currently need?   1 = Unable, Total/Dependent Assistance  2 = A lot, Maximum/Moderate Assistance  3 = A little, Minimum/Contact Guard/Supervision  4 = None, Modified [place]/Independent    Putting on and taking off regular lower body clothing? : 2  Bathing (including washing, rinsing, drying)?: 2  Toileting, which includes using toilet, bedpan, or urinal? : 2  Putting on and taking off regular upper body clothing?: 2  Taking care of personal grooming such as brushing teeth?: 3  Eating meals?: 3  Daily Activity Total Score: 14     AM-PAC Raw Score CMS "G-Code Modifier Level of Impairment Assistance   6 % Total / Unable   7 - 8 CM 80 - 100% Maximal Assist   9-13 CL 60 - 80% Moderate Assist   14 - 19 CK 40 - 60% Moderate Assist   20 - 22 CJ 20 - 40% Minimal Assist   23 CI 1-20% SBA / CGA   24 CH 0% Independent/ Mod I       Patient left supine with all lines intact, call button " in reach and Nurse Bernice notified    ASSESSMENT:  Hilary sEqueda is a 87 y.o. female with a medical diagnosis of COVID-19 and presents with impaired functional mobility and ADLs. Pt will benefit from continued skilled OT in order to address the listed impairments.     Rehab identified problem list/impairments: Rehab identified problem list/impairments: impaired endurance, weakness, impaired self care skills, impaired functional mobilty, decreased coordination, pain    Rehab potential is fair.    Activity tolerance: Fair    Discharge recommendations: Discharge Facility/Level of Care Needs: nursing facility, skilled     Barriers to discharge:      Equipment recommendations: walker, rolling     GOALS:   Multidisciplinary Problems     Occupational Therapy Goals        Problem: Occupational Therapy Goal    Goal Priority Disciplines Outcome Interventions   Occupational Therapy Goal     OT, PT/OT Ongoing, Progressing    Description: OT GOALS TO BE MET 7-5-20  PT WILL TOLERATE 1 SET X 10 REPS B UE ROM  MIN A WITH BSC T/F'S  MOD A WITH BED MOBILITY                   Plan:  Patient to be seen 3 x/week to address the above listed problems via self-care/home management, therapeutic activities, therapeutic exercises  Plan of Care expires:    Plan of Care reviewed with: patient         Mikaela Rivasolm, PT/OT  06/30/2020

## 2020-06-30 NOTE — ASSESSMENT & PLAN NOTE
- COVID-19 testing: positive   - Infection Control notified     - Isolation:   - Airborne, Contact and Droplet Precautions  - Cohort patients into COVID units  - N95 mask, wear eye protection  - 20 second hand hygiene              - Limit visitors per hospital policy              - Consolidating lab draws, nursing care, provider visits, and interventions    - Diagnostics: (leukopenia, hyponatremia, hyperferritinemia, elevated troponin, elevated d-dimer, age, and comorbidities are significant predictors of poor clinical outcome)  CBC, CMP, Ferritin, CRP and Portable CXR    - Management:  Supplemental O2 to maintain SpO2 >92%  Telemetry  Continuous/intermittent Pulse Ox  Albuterol treatment PRN  Pharmacy Consult for Remdesivir Pending  Add Doxy and Ceft (PCT+)  Continue to monitor         6/27   Day 2 of 5 of Remdesivir  Continue rest of care     6/28  Due to PICC line issues yesterday was Day 1 of Remdesivir   Today is Day 2   Continue rest treatment plan     6/29  Day 2/4 Remdesivir  SNF placement

## 2020-06-30 NOTE — PROGRESS NOTES
[facility] Hospital Medicine  Progress Note    Patient Name: Hilary Esqueda  MRN: [id number]  Patient Class: IP- Inpatient   Admission Date: 6/25/2020  Length of Stay: 4 days  Attending Physician: Laurent Norman MD  Primary Care Provider: Afua Hector MD        Subjective:     Principal Problem:COVID-19        HPI:  Mrs. Esqueda is an 88 yo AAF with Hx of HTN, DM, GERD, OA, Mild Dementia who comes from NH for evaluation of SOB. Per patient she reports that for the past week she has not been feeling well. She endorses chills, cough, SOB, nausea. Notes that she had two negative COVID test during this time. Today she was unable to catch her breath and required oxygen. In ED patient noted to be hypoxic, with COVID+ and CXR concerning for PNA and admitted for further management.       Overview/Hospital Course:  6/26  Patient seen at bedside this morning, resting comfortably. Reports she was cold overnight. Notes he cough and SOB remain stable. Continues to report pain in her knee, discussed adding Tramadol for pain and she agreed. No other acute events in the last 24 hours.   6/27  Patient seen this morning at bedside, resting comfortably. She started Remdesivir yesterday. Also reports Tramadol is helping for pain. No acute events in the last 24 hours.   6/28   Patient seen this morning at bedside, resting comfortably. Seems as patient PICC was clotted and required Cathflo yesterday, afterwards worked fine. Due to this Remdesivir was not started until yesterday. Patient not requiring supplemental oxygen at the moment. Is feeling well, has no specific complaints.   6/29  Patient Patient improved finishing up remdesivir. Will complete on 7/1PT recommending return to SNF. Patient came from [facility] Age COVID + . Await placement. No events    Interval History: Day 2/4 of Remdesivir Plan for SNF placement Await CM No events    Review of Systems   Constitutional: Positive for fatigue. Negative for  activity change and chills.   HENT: Negative for congestion, ear pain, rhinorrhea and sore throat.    Respiratory: Negative for cough, chest tightness and shortness of breath.    Cardiovascular: Negative for chest pain.   Gastrointestinal: Negative for abdominal pain, diarrhea, nausea and vomiting.   Musculoskeletal: Positive for arthralgias.   Neurological: Positive for weakness. Negative for headaches.     Objective:     Vital Signs (Most Recent):  Temp: 98 °F (36.7 °C) (06/29/20 1608)  Pulse: 89 (06/29/20 1700)  Resp: 18 (06/29/20 1608)  BP: 115/72 (06/29/20 1608)  SpO2: 95 % (06/29/20 1608) Vital Signs (24h Range):  Temp:  [98 °F (36.7 °C)-98.6 °F (37 °C)] 98 °F (36.7 °C)  Pulse:  [61-91] 89  Resp:  [18-19] 18  SpO2:  [92 %-99 %] 95 %  BP: (111-136)/(56-72) 115/72     Weight: 101.2 kg (223 lb 1.7 oz)  Body mass index is 38.3 kg/m².    Intake/Output Summary (Last 24 hours) at 6/29/2020 1907  Last data filed at 6/29/2020 1616  Gross per 24 hour   Intake 1140 ml   Output 300 ml   Net 840 ml      Physical Exam  Vitals signs and nursing note reviewed.   Constitutional:       Appearance: She is well-developed. She is ill-appearing.   HENT:      Head: Normocephalic and atraumatic.      Right Ear: External ear normal.      Left Ear: External ear normal.      Nose: Nose normal.   Eyes:      Conjunctiva/sclera: Conjunctivae normal.      Pupils: Pupils are equal, round, and reactive to light.   Neck:      Musculoskeletal: Normal range of motion and neck supple.      Thyroid: No thyromegaly.      Vascular: No JVD.   Cardiovascular:      Rate and Rhythm: Normal rate and regular rhythm.      Heart sounds: Normal heart sounds. No murmur. No friction rub. No gallop.    Pulmonary:      Effort: Pulmonary effort is normal. No respiratory distress.      Breath sounds: Normal breath sounds. No stridor. No wheezing or rales.   Chest:      Chest wall: No tenderness.   Abdominal:      General: Bowel sounds are normal. There is no  distension.      Palpations: Abdomen is soft. There is no mass.      Tenderness: There is no abdominal tenderness. There is no guarding or rebound.   Genitourinary:     Vagina: Normal. No vaginal discharge.   Musculoskeletal: Normal range of motion.         General: No deformity.   Lymphadenopathy:      Cervical: No cervical adenopathy.   Skin:     General: Skin is warm.      Capillary Refill: Capillary refill takes less than 2 seconds.      Findings: No erythema or rash.      Comments: Multiple stage 3 pressure ulcers sacaral, coccygeal, bilateral buttock   Neurological:      Mental Status: She is alert and oriented to person, place, and time.      Cranial Nerves: No cranial nerve deficit.      Sensory: No sensory deficit.      Deep Tendon Reflexes: Reflexes normal.   Psychiatric:         Behavior: Behavior normal.         Significant Labs:   Blood Culture: No results for input(s): LABBLOO in the last 48 hours.  BMP:   Recent Labs   Lab 06/29/20  0416   *      K 3.2*      CO2 20*   BUN 24*   CREATININE 0.7   CALCIUM 7.6*     CBC: No results for input(s): WBC, HGB, HCT, PLT in the last 48 hours.  CMP:   Recent Labs   Lab 06/28/20  0523 06/29/20  0416    143   K 3.4* 3.2*   * 110   CO2 23 20*   * 131*   BUN 20 24*   CREATININE 0.7 0.7   CALCIUM 7.8* 7.6*   PROT 5.6* 5.3*   ALBUMIN 1.7* 1.6*   BILITOT 0.2 0.2   ALKPHOS 98 94   AST 10 10   ALT 10 8*   ANIONGAP 5* 13   EGFRNONAA >60 >60       All pertinent labs within the past 24 hours have been reviewed.    Significant Imaging: I have reviewed all pertinent imaging results/findings within the past 24 hours.      Assessment/Plan:      * COVID-19  - COVID-19 testing: positive   - Infection Control notified     - Isolation:   - Airborne, Contact and Droplet Precautions  - Cohort patients into COVID units  - N95 mask, wear eye protection  - 20 second hand hygiene              - Limit visitors per hospital policy              -  Consolidating lab draws, nursing care, provider visits, and interventions    - Diagnostics: (leukopenia, hyponatremia, hyperferritinemia, elevated troponin, elevated d-dimer, age, and comorbidities are significant predictors of poor clinical outcome)  CBC, CMP, Ferritin, CRP and Portable CXR    - Management:  Supplemental O2 to maintain SpO2 >92%  Telemetry  Continuous/intermittent Pulse Ox  Albuterol treatment PRN  Pharmacy Consult for Remdesivir Pending  Add Doxy and Ceft (PCT+)  Continue to monitor        6/27   Day 2 of 5 of Remdesivir  Continue rest of care     6/28  Due to PICC line issues yesterday was Day 1 of Remdesivir   Today is Day 2   Continue rest treatment plan     6/29  Day 2/4 Remdesivir  SNF placement        SOB (shortness of breath)  2/2 COVID   Treatment as above       Acute hypoxemic respiratory failure  Monitor Respiratory Status  Supplemental Oxygen keep O2 Sat >92%        Gastroesophageal reflux disease without esophagitis  Famotidine 20mg     Essential hypertension  Monitor BP  Norvasc 10mg       Diabetes mellitus without complication  Monitor F/S  Diabetic Diet  ISS        VTE Risk Mitigation (From admission, onward)    None                Laurent Norman MD  Department of Hospital Medicine   [facility]

## 2020-06-30 NOTE — PROGRESS NOTES
[facility] Hospital Medicine  Progress Note    Patient Name: Hilary Esqueda  MRN: [id number]  Patient Class: IP- Inpatient   Admission Date: 6/25/2020  Length of Stay: 5 days  Attending Physician: Laurent Norman MD  Primary Care Provider: Afua Hector MD        Subjective:     Principal Problem:COVID-19        HPI:  Mrs. Esqueda is an 88 yo AAF with Hx of HTN, DM, GERD, OA, Mild Dementia who comes from NH for evaluation of SOB. Per patient she reports that for the past week she has not been feeling well. She endorses chills, cough, SOB, nausea. Notes that she had two negative COVID test during this time. Today she was unable to catch her breath and required oxygen. In ED patient noted to be hypoxic, with COVID+ and CXR concerning for PNA and admitted for further management.       Overview/Hospital Course:  6/26  Patient seen at bedside this morning, resting comfortably. Reports she was cold overnight. Notes he cough and SOB remain stable. Continues to report pain in her knee, discussed adding Tramadol for pain and she agreed. No other acute events in the last 24 hours.   6/27  Patient seen this morning at bedside, resting comfortably. She started Remdesivir yesterday. Also reports Tramadol is helping for pain. No acute events in the last 24 hours.   6/28   Patient seen this morning at bedside, resting comfortably. Seems as patient PICC was clotted and required Cathflo yesterday, afterwards worked fine. Due to this Remdesivir was not started until yesterday. Patient not requiring supplemental oxygen at the moment. Is feeling well, has no specific complaints.   6/29  Patient Patient improved finishing up remdesivir. Will complete on 7/1PT recommending return to SNF. Patient came from [facility] Age COVID + . Await placement. No events  6/30  Patient to complete remdesivir tomorrow. Patient improving  Plan for hospital bed at home with home health. Likely d/c in am    Interval History: Improving.  Plan for d/c in progress.    Review of Systems   Constitutional: Positive for activity change, appetite change and fatigue. Negative for unexpected weight change.   HENT: Negative.  Negative for trouble swallowing.    Eyes: Negative.    Respiratory: Negative.  Negative for cough, shortness of breath and wheezing.    Cardiovascular: Negative.  Negative for chest pain.   Gastrointestinal: Negative.    Endocrine: Negative.    Genitourinary: Negative.    Musculoskeletal: Negative.    Skin: Negative.    Allergic/Immunologic: Negative.    Neurological: Positive for weakness. Negative for dizziness.   Hematological: Negative.    Psychiatric/Behavioral: Negative.    All other systems reviewed and are negative.    Objective:     Vital Signs (Most Recent):  Temp: 98.5 °F (36.9 °C) (06/30/20 1559)  Pulse: 93 (06/30/20 1700)  Resp: 18 (06/30/20 1559)  BP: (!) 115/51 (06/30/20 1559)  SpO2: 97 % (06/30/20 1559) Vital Signs (24h Range):  Temp:  [98.4 °F (36.9 °C)-99.3 °F (37.4 °C)] 98.5 °F (36.9 °C)  Pulse:  [] 93  Resp:  [16-20] 18  SpO2:  [92 %-97 %] 97 %  BP: ()/(51-78) 115/51     Weight: 100 kg (220 lb 7.4 oz)  Body mass index is 37.84 kg/m².    Intake/Output Summary (Last 24 hours) at 6/30/2020 1805  Last data filed at 6/30/2020 1705  Gross per 24 hour   Intake 830 ml   Output 500 ml   Net 330 ml      Physical Exam  Vitals signs and nursing note reviewed.   Constitutional:       Appearance: She is well-developed. She is ill-appearing.   HENT:      Head: Normocephalic and atraumatic.      Right Ear: External ear normal.      Left Ear: External ear normal.      Nose: Nose normal.   Eyes:      Conjunctiva/sclera: Conjunctivae normal.      Pupils: Pupils are equal, round, and reactive to light.   Neck:      Musculoskeletal: Normal range of motion and neck supple.      Thyroid: No thyromegaly.      Vascular: No JVD.   Cardiovascular:      Rate and Rhythm: Normal rate and regular rhythm.      Heart sounds: Normal heart  sounds. No murmur. No friction rub. No gallop.    Pulmonary:      Effort: Pulmonary effort is normal. No respiratory distress.      Breath sounds: Normal breath sounds. No stridor. No wheezing or rales.   Chest:      Chest wall: No tenderness.   Abdominal:      General: Bowel sounds are normal. There is no distension.      Palpations: Abdomen is soft. There is no mass.      Tenderness: There is no abdominal tenderness. There is no guarding or rebound.   Genitourinary:     Vagina: Normal. No vaginal discharge.   Musculoskeletal: Normal range of motion.         General: No deformity.   Lymphadenopathy:      Cervical: No cervical adenopathy.   Skin:     General: Skin is warm.      Capillary Refill: Capillary refill takes less than 2 seconds.      Findings: No erythema or rash.      Comments: Multiple stage 3 pressure ulcers sacaral, coccygeal, bilateral buttock   Neurological:      Mental Status: She is alert and oriented to person, place, and time.      Cranial Nerves: No cranial nerve deficit.      Sensory: No sensory deficit.      Deep Tendon Reflexes: Reflexes normal.   Psychiatric:         Behavior: Behavior normal.         Significant Labs:   BMP:   Recent Labs   Lab 06/30/20  0236   *      K 3.3*   *   CO2 22*   BUN 24*   CREATININE 0.7   CALCIUM 7.8*     CBC: No results for input(s): WBC, HGB, HCT, PLT in the last 48 hours.  CMP:   Recent Labs   Lab 06/29/20  0416 06/30/20  0236    144   K 3.2* 3.3*    113*   CO2 20* 22*   * 146*   BUN 24* 24*   CREATININE 0.7 0.7   CALCIUM 7.6* 7.8*   PROT 5.3* 5.2*   ALBUMIN 1.6* 1.6*   BILITOT 0.2 0.1   ALKPHOS 94 97   AST 10 22   ALT 8* 16   ANIONGAP 13 9   EGFRNONAA >60 >60     All pertinent labs within the past 24 hours have been reviewed.    Significant Imaging: I have reviewed all pertinent imaging results/findings within the past 24 hours.      Assessment/Plan:      * COVID-19  - COVID-19 testing: positive   - Infection Control  notified     - Isolation:   - Airborne, Contact and Droplet Precautions  - Cohort patients into COVID units  - N95 mask, wear eye protection  - 20 second hand hygiene              - Limit visitors per hospital policy              - Consolidating lab draws, nursing care, provider visits, and interventions    - Diagnostics: (leukopenia, hyponatremia, hyperferritinemia, elevated troponin, elevated d-dimer, age, and comorbidities are significant predictors of poor clinical outcome)  CBC, CMP, Ferritin, CRP and Portable CXR    - Management:  Supplemental O2 to maintain SpO2 >92%  Telemetry  Continuous/intermittent Pulse Ox  Albuterol treatment PRN  Pharmacy Consult for Remdesivir Pending  Add Doxy and Ceft (PCT+)  Continue to monitor        6/27   Day 2 of 5 of Remdesivir  Continue rest of care     6/28  Due to PICC line issues yesterday was Day 1 of Remdesivir   Today is Day 2   Continue rest treatment plan     6/29  Day 2/4 Remdesivir  SNF placement    6/30  Day 3/4 Remdesivir  Home with HH  ID        SOB (shortness of breath)  2/2 COVID   Treatment as above       Acute hypoxemic respiratory failure  Monitor Respiratory Status  Supplemental Oxygen keep O2 Sat >92%        Gastroesophageal reflux disease without esophagitis  Famotidine 20mg     Essential hypertension  Monitor BP  Norvasc 10mg       Diabetes mellitus without complication  Monitor F/S  Diabetic Diet  ISS        VTE Risk Mitigation (From admission, onward)    None                Laurent Norman MD  Department of Hospital Medicine   [facility]

## 2020-06-30 NOTE — PLAN OF CARE
[facility] Medical Necessity form and home health orders sent via [facility].       06/30/20 3482   Post-Acute Status   Post-Acute Authorization Home Health   Home Health Status Referrals Sent

## 2020-07-01 VITALS
HEIGHT: 64 IN | RESPIRATION RATE: 20 BRPM | TEMPERATURE: 99 F | OXYGEN SATURATION: 91 % | HEART RATE: 79 BPM | BODY MASS INDEX: 38.02 KG/M2 | DIASTOLIC BLOOD PRESSURE: 60 MMHG | SYSTOLIC BLOOD PRESSURE: 124 MMHG | WEIGHT: 222.69 LBS

## 2020-07-01 DIAGNOSIS — U07.1 COVID-19 VIRUS DETECTED: ICD-10-CM

## 2020-07-01 PROBLEM — J96.01 ACUTE HYPOXEMIC RESPIRATORY FAILURE: Status: RESOLVED | Noted: 2020-06-25 | Resolved: 2020-07-01

## 2020-07-01 LAB
ALBUMIN SERPL BCP-MCNC: 1.7 G/DL (ref 3.5–5.2)
ALP SERPL-CCNC: 110 U/L (ref 55–135)
ALT SERPL W/O P-5'-P-CCNC: 37 U/L (ref 10–44)
ANION GAP SERPL CALC-SCNC: 11 MMOL/L (ref 8–16)
AST SERPL-CCNC: 72 U/L (ref 10–40)
BILIRUB SERPL-MCNC: 0.2 MG/DL (ref 0.1–1)
BUN SERPL-MCNC: 23 MG/DL (ref 8–23)
CALCIUM SERPL-MCNC: 7.6 MG/DL (ref 8.7–10.5)
CHLORIDE SERPL-SCNC: 115 MMOL/L (ref 95–110)
CO2 SERPL-SCNC: 15 MMOL/L (ref 23–29)
CREAT SERPL-MCNC: 0.7 MG/DL (ref 0.5–1.4)
EST. GFR  (AFRICAN AMERICAN): >60 ML/MIN/1.73 M^2
EST. GFR  (NON AFRICAN AMERICAN): >60 ML/MIN/1.73 M^2
GLUCOSE SERPL-MCNC: 149 MG/DL (ref 70–110)
POCT GLUCOSE: 156 MG/DL (ref 70–110)
POCT GLUCOSE: 188 MG/DL (ref 70–110)
POTASSIUM SERPL-SCNC: 3.5 MMOL/L (ref 3.5–5.1)
PROT SERPL-MCNC: 5.4 G/DL (ref 6–8.4)
SODIUM SERPL-SCNC: 141 MMOL/L (ref 136–145)

## 2020-07-01 PROCEDURE — 25000003 PHARM REV CODE 250: Performed by: INTERNAL MEDICINE

## 2020-07-01 PROCEDURE — 36415 COLL VENOUS BLD VENIPUNCTURE: CPT

## 2020-07-01 PROCEDURE — 97110 THERAPEUTIC EXERCISES: CPT

## 2020-07-01 PROCEDURE — 97530 THERAPEUTIC ACTIVITIES: CPT

## 2020-07-01 PROCEDURE — 80053 COMPREHEN METABOLIC PANEL: CPT

## 2020-07-01 RX ADMIN — FAMOTIDINE 20 MG: 20 TABLET ORAL at 08:07

## 2020-07-01 RX ADMIN — INSULIN ASPART 2 UNITS: 100 INJECTION, SOLUTION INTRAVENOUS; SUBCUTANEOUS at 06:07

## 2020-07-01 RX ADMIN — OXYBUTYNIN CHLORIDE 5 MG: 5 TABLET, EXTENDED RELEASE ORAL at 08:07

## 2020-07-01 RX ADMIN — SODIUM CHLORIDE 100 MG: 0.9 INJECTION, SOLUTION INTRAVENOUS at 01:07

## 2020-07-01 RX ADMIN — INSULIN ASPART 2 UNITS: 100 INJECTION, SOLUTION INTRAVENOUS; SUBCUTANEOUS at 11:07

## 2020-07-01 NOTE — NURSING
Spoke with patient's granddaughter who stated she should be arriving to  the pt within an hour or so. Notified charge nurse Benito.     PICC to JEANE removed by RN; no complications noted. Tele monitor returned to monitor room. Patient dressed and all belongings packed.

## 2020-07-01 NOTE — PT/OT/SLP PROGRESS
Physical Therapy  Treatment    Hilary Esqueda   MRN: [id number]   Admitting Diagnosis: COVID-19    PT Received On: 07/01/20  PT Start Time: 1140     PT Stop Time: 1205    PT Total Time (min): 25 min       Billable Minutes:  Therapeutic Activity 15 and Therapeutic Exercise 10    Treatment Type: Treatment  PT/PTA: PT     PTA Visit Number: 0       General Precautions: Standard, droplet, airborne, contact  Orthopedic Precautions: N/A   Braces: N/A         Subjective:  Communicated with NURSE KNUTSON AND Epic CHART REVIEW  prior to session.   PT AGREED TO TX HOWEVER REFUSED TO PRACTICE STANDING.    Pain/Comfort  Pain Rating 1: 0/10  Pain Rating Post-Intervention 1: 0/10    Objective:   Patient found with: telemetry, peripheral IV    Functional Mobility:  PT MET IN RM SUP IN BED. PT AGREED TO SIT EOB WITH SBA. PT SCOOTED TO EOB WITH SBA. PT REFUSING TO ATTEMPT TO STAND WITH P.T. PT COMPLETED MIN TE X 10 REPS OF AP, TKE, AND MIP WITH LIMITED ROM. PT COMPLETED 5 REPS OF BED RAIL PUSH UPS WITH LIMITED ROM. PT REFUSED OOB ACTIVITY X  2 AND RETURNED SUP IN BED WITH MIN A. PT LEFT WITH ALL NEEDS MET AND CALL BELL IN REACH    AM-PAC 6 CLICK MOBILITY  How much help from another person does this patient currently need?   1 = Unable, Total/Dependent Assistance  2 = A lot, Maximum/Moderate Assistance  3 = A little, Minimum/Contact Guard/Supervision  4 = None, Modified [place]/Independent    Turning over in bed (including adjusting bedclothes, sheets and blankets)?: 4  Sitting down on and standing up from a chair with arms (e.g., wheelchair, bedside commode, etc.): 1  Moving from lying on back to sitting on the side of the bed?: 4  Moving to and from a bed to a chair (including a wheelchair)?: 1(PT REFUSED)  Need to walk in hospital room?: 1  Climbing 3-5 steps with a railing?: 1  Basic Mobility Total Score: 12    AM-PAC Raw Score CMS G-Code Modifier Level of Impairment Assistance   6 % Total / Unable   7 - 9 CM 80 - 100%  Maximal Assist   10 - 14 CL 60 - 80% Moderate Assist   15 - 19 CK 40 - 60% Moderate Assist   20 - 22 CJ 20 - 40% Minimal Assist   23 CI 1-20% SBA / CGA   24 CH 0% Independent/ Mod I     Patient left supine with call button in reach and chair alarm on.    Assessment:  PT JUAREZ MIN TX  Rehab identified problem list/impairments: Rehab identified problem list/impairments: weakness, impaired endurance, impaired self care skills, impaired functional mobilty, gait instability, impaired balance, decreased safety awareness, decreased lower extremity function, decreased upper extremity function, decreased ROM    Rehab potential is good.    Activity tolerance: Poor    Discharge recommendations: Discharge Facility/Level of Care Needs: nursing facility, skilled     Barriers to discharge:      Equipment recommendations:       GOALS:   Multidisciplinary Problems     Physical Therapy Goals        Problem: Physical Therapy Goal    Goal Priority Disciplines Outcome Goal Variances Interventions   Physical Therapy Goal     PT, PT/OT Ongoing, Progressing     Description: 1. Patient will perform supine to/from sit min A  2. Patient will perform sit to/from stand with RW min A  3. Patient will ambulate 25ft RW min A                    PLAN:    Patient to be seen 5 x/week  to address the above listed problems via therapeutic activities, therapeutic exercises  Plan of Care expires: 07/05/20  Plan of Care reviewed with: patient         Grace Hamlin, PT  07/01/2020

## 2020-07-01 NOTE — PROGRESS NOTES
Home Oxygen Evaluation    Date Performed: 2020    1) Patient's Home O2 Sat on room air, while at rest: 94        If O2 sats on room air at rest are 88% or below, patient qualifies. No additional testing needed. Document N/A in steps 2 and 3. If 89% or above, complete steps 2.      2) Patient's O2 Sat on room air while exercisin        If O2 sats on room air while exercising remain 89% or above patient does not qualify, no further testing needed Document N/A in step 3. If O2 sats on room air while exercising are 88% or below, continue to step 3.      3) Patient's O2 Sat while exercising on NA         (Must show improvement from #2 for patients to qualify)    If O2 sats improve on oxygen, patient qualifies for portable oxygen. If not, the patient does not qualify.      PT unable to walk . Exercised in bed.

## 2020-07-01 NOTE — NURSING
Spoke to patient' granddaughter, Anastaisa, who will be picking the patient up today for d/c. She stated she is waiting on the hospital bed to be delivered to her house for the pt and then she will call when she is on her way.

## 2020-07-01 NOTE — DISCHARGE SUMMARY
[facility] Hospital Medicine  Discharge Summary      Patient Name: Hilary Esqueda  MRN: [id number]  Admission Date: 6/25/2020  Hospital Length of Stay: 6 days  Discharge Date and Time:  07/01/2020 12:16 PM  Attending Physician: Diony Bedolla MD   Discharging Provider: AMANDA Leggett  Primary Care Provider: Afua Hector MD      HPI:   Mrs. Esqueda is an 86 yo AAF with Hx of HTN, DM, GERD, OA, Mild Dementia who comes from NH for evaluation of SOB. Per patient she reports that for the past week she has not been feeling well. She endorses chills, cough, SOB, nausea. Notes that she had two negative COVID test during this time. Today she was unable to catch her breath and required oxygen. In ED patient noted to be hypoxic, with COVID+ and CXR concerning for PNA and admitted for further management.       * No surgery found *      Hospital Course:   6/26  Patient seen at bedside this morning, resting comfortably. Reports she was cold overnight. Notes he cough and SOB remain stable. Continues to report pain in her knee, discussed adding Tramadol for pain and she agreed. No other acute events in the last 24 hours.   6/27  Patient seen this morning at bedside, resting comfortably. She started Remdesivir yesterday. Also reports Tramadol is helping for pain. No acute events in the last 24 hours.   6/28   Patient seen this morning at bedside, resting comfortably. Seems as patient PICC was clotted and required Cathflo yesterday, afterwards worked fine. Due to this Remdesivir was not started until yesterday. Patient not requiring supplemental oxygen at the moment. Is feeling well, has no specific complaints.   6/29  Patient Patient improved finishing up remdesivir. Will complete on 7/1PT recommending return to SNF. Patient came from [facility] COVID + . Await placement. No events  6/30  Patient to complete remdesivir tomorrow. Patient improving  Plan for hospital bed at home with home health.  Likely d/c in am  07/01: Patient continues to improve. Afebrile for several days.  Vitals and labs stable. Did not qualify for home oxygen. Pt does not have smart phone. She will have home health and a hospital bed at home. Discussed discharge with her granddaughter, Anastasia [phone], who will be available to help patient with her needs.     Consults:   Consults (From admission, onward)        Status Ordering Provider     Inpatient consult to PICC team (JAQUELINE)  Once     Provider:  (Not yet assigned)    Acknowledged CHIRAG SULLIVAN     Inpatient consult to Social Work/Case Management  Once     Provider:  (Not yet assigned)    Completed ROMAN GRESHAM     IP consult to case management  Once     Provider:  (Not yet assigned)    Completed CHIRAG SULLIVAN     Pharmacy Remdesivir Consult  Once     Provider:  (Not yet assigned)    Acknowledged CHIRAG SULLIVAN          No new Assessment & Plan notes have been filed under this hospital service since the last note was generated.  Service: Hospital Medicine    Final Active Diagnoses:    Diagnosis Date Noted POA    PRINCIPAL PROBLEM:  COVID-19 [U07.1] 06/25/2020 Yes    Essential hypertension [I10] 06/25/2020 Yes    Gastroesophageal reflux disease without esophagitis [K21.9] 06/25/2020 Yes    SOB (shortness of breath) [R06.02] 06/25/2020 Yes    Diabetes mellitus without complication [E11.9] 04/08/2015 Yes      Problems Resolved During this Admission:    Diagnosis Date Noted Date Resolved POA    Acute hypoxemic respiratory failure [J96.01] 06/25/2020 07/01/2020 Yes       Discharged Condition: stable    Disposition: Home or Self Care    Follow Up:  Follow-up Information     Afua Hector MD In 2 weeks.    Specialty: Urgent Care  Why: hospital follow up  Contact information:  [address]  [place] [address]  [phone]                 Patient Instructions:      HOSPITAL BED FOR HOME USE     Order Specific Question Answer Comments  "  Type: Semi-electric    Length of need (1-99 months): 99    Does patient have medical equipment at home? walker, rolling    Does patient have medical equipment at home? hospital bed    Does patient have medical equipment at home? cane, straight    Height: 5' 4" (1.626 m)    Weight: 100 kg (220 lb 7.4 oz)    Please check all that apply: Patient requires positioning of the body in ways not feasible in an ordinary bed due to a medical condition which is expected to last at least one month.      Ambulatory referral/consult to Home Health   Referral Priority: Routine Referral Type: Home Health   Referral Reason: Specialty Services Required   Requested Specialty: Home Health Services   Number of Visits Requested: 1     Diet diabetic     Diet Cardiac     Notify your health care provider if you experience any of the following:  temperature >100.4     Notify your health care provider if you experience any of the following:  difficulty breathing or increased cough     Activity as tolerated       Significant Diagnostic Studies: Labs:   BMP:   Recent Labs   Lab 06/30/20  0236 07/01/20  0540   * 149*    141   K 3.3* 3.5   * 115*   CO2 22* 15*   BUN 24* 23   CREATININE 0.7 0.7   CALCIUM 7.8* 7.6*   , CMP   Recent Labs   Lab 06/30/20  0236 07/01/20  0540    141   K 3.3* 3.5   * 115*   CO2 22* 15*   * 149*   BUN 24* 23   CREATININE 0.7 0.7   CALCIUM 7.8* 7.6*   PROT 5.2* 5.4*   ALBUMIN 1.6* 1.7*   BILITOT 0.1 0.2   ALKPHOS 97 110   AST 22 72*   ALT 16 37   ANIONGAP 9 11   ESTGFRAFRICA >60 >60   EGFRNONAA >60 >60   , CBC No results for input(s): WBC, HGB, HCT, PLT in the last 48 hours. and All labs within the past 24 hours have been reviewed    Pending Diagnostic Studies:     Procedure Component Value Units Date/Time    Comprehensive metabolic panel [[id number]] Collected: 06/28/20 1048    Order Status: Sent Lab Status: In process Updated: 06/28/20 1049    Specimen: Blood        "   Medications:  Reconciled Home Medications:      Medication List      START taking these medications    nozaseptin  nasal   Commonly known as: NOZIN  1 each by Each Nostril route 2 (two) times daily.        CONTINUE taking these medications    amLODIPine 10 MG tablet  Commonly known as: NORVASC     diclofenac sodium 1 % Gel  Commonly known as: VOLTAREN  Apply 4 g topically 3 (three) times daily.     gabapentin 100 MG capsule  Commonly known as: NEURONTIN     glipiZIDE 10 MG tablet  Commonly known as: GLUCOTROL     hydroCHLOROthiazide 25 MG tablet  Commonly known as: HYDRODIURIL     metFORMIN 500 MG tablet  Commonly known as: GLUCOPHAGE     naproxen 375 MG tablet  Commonly known as: NAPROSYN     oxybutynin 5 MG Tr24  Commonly known as: DITROPAN-XL     ranitidine 150 MG tablet  Commonly known as: ZANTAC            Indwelling Lines/Drains at time of discharge:   Lines/Drains/Airways     none          Time spent on the discharge of patient: 50 minutes  Patient was seen and examined on the date of discharge and determined to be suitable for discharge.         AMANDA Leggett  Department of Hospital Medicine  [facility]

## 2020-07-01 NOTE — PLAN OF CARE
Pt alert and oriented to self, place, and situation, but disoriented to time.. VSS, pt has been o2sat  greater 90 on room air. Pt remained free of falls this shift. Pt complained of nausea during shift. Medications administered as ordered. Pt is A-fib on monitor. Hourly rounding completed. Pt instructed to call for assistance. POC reviewed. Pt verbalized understanding. Will continue to monitor.

## 2020-07-01 NOTE — PLAN OF CARE
Home health referral and orders sent. Awaiting [facility] assignment of  agency.    Hospital bed authorized and will be set up today.    Patient ready for discharge from  standpoint.       07/01/20 1225   Post-Acute Status   Post-Acute Authorization Home Health;HME   HME Status Set-up Complete

## 2020-07-02 ENCOUNTER — PATIENT OUTREACH (OUTPATIENT)
Dept: ADMINISTRATIVE | Facility: CLINIC | Age: 85
End: 2020-07-02

## 2020-07-02 RX ORDER — FAMOTIDINE 20 MG/1
20 TABLET, FILM COATED ORAL 2 TIMES DAILY
COMMUNITY
End: 2020-08-11 | Stop reason: CLARIF

## 2020-07-02 NOTE — TELEPHONE ENCOUNTER
Anastasia, granddaughter, called back to ask questions. She wanted to know about [employer] info. Instructed if not contacted by [employer] by end of today to reach out [facility].

## 2020-07-02 NOTE — PLAN OF CARE
07/02/20 1205   Final Note   Assessment Type Final Discharge Note   Anticipated Discharge Disposition Home-Health   Right Care Referral Info   Post Acute Recommendation Home-care   Facility Name [facility]

## 2020-07-02 NOTE — PATIENT INSTRUCTIONS

## 2020-08-10 PROBLEM — A41.9 SEVERE SEPSIS: Status: ACTIVE | Noted: 2020-08-10

## 2020-08-10 PROBLEM — R65.20 SEVERE SEPSIS: Status: ACTIVE | Noted: 2020-08-10

## 2020-08-10 PROBLEM — Z71.89 ACP (ADVANCE CARE PLANNING): Status: ACTIVE | Noted: 2020-08-10

## 2020-08-10 PROBLEM — N39.0 UTI (URINARY TRACT INFECTION): Status: ACTIVE | Noted: 2020-08-10

## 2020-08-10 PROBLEM — R79.89 ELEVATED TROPONIN: Status: ACTIVE | Noted: 2020-08-10

## 2020-08-12 PROBLEM — N93.9 VAGINAL BLEEDING: Status: ACTIVE | Noted: 2020-08-12

## 2020-09-30 ENCOUNTER — LAB VISIT (OUTPATIENT)
Dept: LAB | Facility: HOSPITAL | Age: 85
End: 2020-09-30
Attending: INTERNAL MEDICINE
Payer: MEDICARE

## 2020-09-30 DIAGNOSIS — E11.9 DIABETES MELLITUS WITHOUT COMPLICATION: Primary | ICD-10-CM

## 2020-09-30 LAB
ESTIMATED AVG GLUCOSE: 252 MG/DL (ref 68–131)
HBA1C MFR BLD HPLC: 10.4 % (ref 4–5.6)

## 2020-09-30 PROCEDURE — 83036 HEMOGLOBIN GLYCOSYLATED A1C: CPT

## 2021-04-16 ENCOUNTER — LAB VISIT (OUTPATIENT)
Dept: LAB | Facility: HOSPITAL | Age: 86
End: 2021-04-16
Attending: INTERNAL MEDICINE
Payer: MEDICARE

## 2021-04-16 DIAGNOSIS — I10 ESSENTIAL HYPERTENSION, MALIGNANT: ICD-10-CM

## 2021-04-16 DIAGNOSIS — E11.9 DIABETES MELLITUS WITHOUT COMPLICATION: Primary | ICD-10-CM

## 2021-04-16 LAB
ALBUMIN SERPL BCP-MCNC: 2.3 G/DL (ref 3.5–5.2)
ALP SERPL-CCNC: 70 U/L (ref 55–135)
ALT SERPL W/O P-5'-P-CCNC: 6 U/L (ref 10–44)
ANION GAP SERPL CALC-SCNC: 13 MMOL/L (ref 8–16)
AST SERPL-CCNC: 8 U/L (ref 10–40)
BILIRUB SERPL-MCNC: 0.4 MG/DL (ref 0.1–1)
BUN SERPL-MCNC: 28 MG/DL (ref 8–23)
CALCIUM SERPL-MCNC: 10 MG/DL (ref 8.7–10.5)
CHLORIDE SERPL-SCNC: 108 MMOL/L (ref 95–110)
CHOLEST SERPL-MCNC: 127 MG/DL (ref 120–199)
CHOLEST/HDLC SERPL: 3.6 {RATIO} (ref 2–5)
CO2 SERPL-SCNC: 23 MMOL/L (ref 23–29)
CREAT SERPL-MCNC: 0.8 MG/DL (ref 0.5–1.4)
EST. GFR  (AFRICAN AMERICAN): >60 ML/MIN/1.73 M^2
EST. GFR  (NON AFRICAN AMERICAN): >60 ML/MIN/1.73 M^2
GLUCOSE SERPL-MCNC: 118 MG/DL (ref 70–110)
HDLC SERPL-MCNC: 35 MG/DL (ref 40–75)
HDLC SERPL: 27.6 % (ref 20–50)
LDLC SERPL CALC-MCNC: 77.2 MG/DL (ref 63–159)
NONHDLC SERPL-MCNC: 92 MG/DL
POTASSIUM SERPL-SCNC: 3.8 MMOL/L (ref 3.5–5.1)
PROT SERPL-MCNC: 6.9 G/DL (ref 6–8.4)
SODIUM SERPL-SCNC: 144 MMOL/L (ref 136–145)
TRIGL SERPL-MCNC: 74 MG/DL (ref 30–150)

## 2021-04-16 PROCEDURE — 85025 COMPLETE CBC W/AUTO DIFF WBC: CPT | Performed by: INTERNAL MEDICINE

## 2021-04-16 PROCEDURE — 80061 LIPID PANEL: CPT | Performed by: INTERNAL MEDICINE

## 2021-04-16 PROCEDURE — 80053 COMPREHEN METABOLIC PANEL: CPT | Performed by: INTERNAL MEDICINE

## 2021-04-16 PROCEDURE — 83036 HEMOGLOBIN GLYCOSYLATED A1C: CPT | Performed by: INTERNAL MEDICINE

## 2021-04-17 LAB
BASOPHILS # BLD AUTO: 0.04 K/UL (ref 0–0.2)
BASOPHILS NFR BLD: 0.5 % (ref 0–1.9)
DIFFERENTIAL METHOD: ABNORMAL
EOSINOPHIL # BLD AUTO: 0.3 K/UL (ref 0–0.5)
EOSINOPHIL NFR BLD: 3.2 % (ref 0–8)
ERYTHROCYTE [DISTWIDTH] IN BLOOD BY AUTOMATED COUNT: 18.6 % (ref 11.5–14.5)
ESTIMATED AVG GLUCOSE: 163 MG/DL (ref 68–131)
HBA1C MFR BLD: 7.3 % (ref 4–5.6)
HCT VFR BLD AUTO: 31.9 % (ref 37–48.5)
HGB BLD-MCNC: 9.4 G/DL (ref 12–16)
IMM GRANULOCYTES # BLD AUTO: 0.06 K/UL (ref 0–0.04)
IMM GRANULOCYTES NFR BLD AUTO: 0.8 % (ref 0–0.5)
LYMPHOCYTES # BLD AUTO: 1.8 K/UL (ref 1–4.8)
LYMPHOCYTES NFR BLD: 22.1 % (ref 18–48)
MCH RBC QN AUTO: 24.7 PG (ref 27–31)
MCHC RBC AUTO-ENTMCNC: 29.5 G/DL (ref 32–36)
MCV RBC AUTO: 84 FL (ref 82–98)
MONOCYTES # BLD AUTO: 0.7 K/UL (ref 0.3–1)
MONOCYTES NFR BLD: 8.3 % (ref 4–15)
NEUTROPHILS # BLD AUTO: 5.2 K/UL (ref 1.8–7.7)
NEUTROPHILS NFR BLD: 65.1 % (ref 38–73)
NRBC BLD-RTO: 0 /100 WBC
PLATELET # BLD AUTO: 253 K/UL (ref 150–450)
PMV BLD AUTO: 13.2 FL (ref 9.2–12.9)
RBC # BLD AUTO: 3.8 M/UL (ref 4–5.4)
WBC # BLD AUTO: 7.92 K/UL (ref 3.9–12.7)